# Patient Record
Sex: MALE | Race: WHITE | NOT HISPANIC OR LATINO | Employment: OTHER | URBAN - METROPOLITAN AREA
[De-identification: names, ages, dates, MRNs, and addresses within clinical notes are randomized per-mention and may not be internally consistent; named-entity substitution may affect disease eponyms.]

---

## 2022-10-06 ENCOUNTER — OFFICE VISIT (OUTPATIENT)
Dept: FAMILY MEDICINE | Facility: CLINIC | Age: 67
End: 2022-10-06
Payer: MEDICARE

## 2022-10-06 VITALS
DIASTOLIC BLOOD PRESSURE: 83 MMHG | OXYGEN SATURATION: 96 % | BODY MASS INDEX: 24.4 KG/M2 | HEIGHT: 71 IN | WEIGHT: 174.3 LBS | HEART RATE: 92 BPM | SYSTOLIC BLOOD PRESSURE: 131 MMHG | TEMPERATURE: 97.3 F

## 2022-10-06 DIAGNOSIS — Z11.59 NEED FOR HEPATITIS C SCREENING TEST: ICD-10-CM

## 2022-10-06 DIAGNOSIS — E78.2 MIXED HYPERLIPIDEMIA: ICD-10-CM

## 2022-10-06 DIAGNOSIS — R73.03 PREDIABETES: ICD-10-CM

## 2022-10-06 DIAGNOSIS — Z13.0 SCREENING, DEFICIENCY ANEMIA, IRON: ICD-10-CM

## 2022-10-06 DIAGNOSIS — B07.8 OTHER VIRAL WARTS: ICD-10-CM

## 2022-10-06 DIAGNOSIS — Z12.5 SCREENING FOR PROSTATE CANCER: ICD-10-CM

## 2022-10-06 DIAGNOSIS — N40.1 BENIGN PROSTATIC HYPERPLASIA WITH NOCTURIA: ICD-10-CM

## 2022-10-06 DIAGNOSIS — Z00.00 ENCOUNTER FOR MEDICARE ANNUAL WELLNESS EXAM: Primary | ICD-10-CM

## 2022-10-06 DIAGNOSIS — Z12.11 SCREEN FOR COLON CANCER: ICD-10-CM

## 2022-10-06 DIAGNOSIS — R35.1 BENIGN PROSTATIC HYPERPLASIA WITH NOCTURIA: ICD-10-CM

## 2022-10-06 LAB
ALBUMIN SERPL-MCNC: 3.7 G/DL (ref 3.4–5)
ALBUMIN UR-MCNC: NEGATIVE MG/DL
ALP SERPL-CCNC: 83 U/L (ref 40–150)
ALT SERPL W P-5'-P-CCNC: 45 U/L (ref 0–70)
ANION GAP SERPL CALCULATED.3IONS-SCNC: 2 MMOL/L (ref 3–14)
APPEARANCE UR: CLEAR
AST SERPL W P-5'-P-CCNC: 25 U/L (ref 0–45)
BILIRUB SERPL-MCNC: 0.7 MG/DL (ref 0.2–1.3)
BILIRUB UR QL STRIP: NEGATIVE
BUN SERPL-MCNC: 23 MG/DL (ref 7–30)
CALCIUM SERPL-MCNC: 9.2 MG/DL (ref 8.5–10.1)
CHLORIDE BLD-SCNC: 111 MMOL/L (ref 94–109)
CHOLEST SERPL-MCNC: 257 MG/DL
CO2 SERPL-SCNC: 27 MMOL/L (ref 20–32)
COLOR UR AUTO: YELLOW
CREAT SERPL-MCNC: 0.94 MG/DL (ref 0.66–1.25)
ERYTHROCYTE [DISTWIDTH] IN BLOOD BY AUTOMATED COUNT: 13.3 % (ref 10–15)
FASTING STATUS PATIENT QL REPORTED: ABNORMAL
GFR SERPL CREATININE-BSD FRML MDRD: 88 ML/MIN/1.73M2
GLUCOSE BLD-MCNC: 107 MG/DL (ref 70–99)
GLUCOSE UR STRIP-MCNC: NEGATIVE MG/DL
HBA1C MFR BLD: 6 % (ref 0–5.6)
HCT VFR BLD AUTO: 43.5 % (ref 40–53)
HCV AB SERPL QL IA: NONREACTIVE
HDLC SERPL-MCNC: 43 MG/DL
HGB BLD-MCNC: 14.9 G/DL (ref 13.3–17.7)
HGB UR QL STRIP: ABNORMAL
KETONES UR STRIP-MCNC: NEGATIVE MG/DL
LDLC SERPL CALC-MCNC: 176 MG/DL
LEUKOCYTE ESTERASE UR QL STRIP: NEGATIVE
MCH RBC QN AUTO: 30 PG (ref 26.5–33)
MCHC RBC AUTO-ENTMCNC: 34.3 G/DL (ref 31.5–36.5)
MCV RBC AUTO: 88 FL (ref 78–100)
NITRATE UR QL: NEGATIVE
NONHDLC SERPL-MCNC: 214 MG/DL
PH UR STRIP: 5.5 [PH] (ref 5–7)
PLATELET # BLD AUTO: 190 10E3/UL (ref 150–450)
POTASSIUM BLD-SCNC: 3.8 MMOL/L (ref 3.4–5.3)
PROT SERPL-MCNC: 7.3 G/DL (ref 6.8–8.8)
PSA SERPL-MCNC: 1.48 UG/L (ref 0–4)
RBC # BLD AUTO: 4.97 10E6/UL (ref 4.4–5.9)
RBC #/AREA URNS AUTO: NORMAL /HPF
SODIUM SERPL-SCNC: 140 MMOL/L (ref 133–144)
SP GR UR STRIP: >=1.03 (ref 1–1.03)
TRIGL SERPL-MCNC: 189 MG/DL
UROBILINOGEN UR STRIP-ACNC: 0.2 E.U./DL
WBC # BLD AUTO: 6.6 10E3/UL (ref 4–11)
WBC #/AREA URNS AUTO: NORMAL /HPF

## 2022-10-06 PROCEDURE — 81001 URINALYSIS AUTO W/SCOPE: CPT | Performed by: FAMILY MEDICINE

## 2022-10-06 PROCEDURE — 90662 IIV NO PRSV INCREASED AG IM: CPT | Performed by: FAMILY MEDICINE

## 2022-10-06 PROCEDURE — 99214 OFFICE O/P EST MOD 30 MIN: CPT | Mod: 25 | Performed by: FAMILY MEDICINE

## 2022-10-06 PROCEDURE — G0103 PSA SCREENING: HCPCS | Performed by: FAMILY MEDICINE

## 2022-10-06 PROCEDURE — G0438 PPPS, INITIAL VISIT: HCPCS | Performed by: FAMILY MEDICINE

## 2022-10-06 PROCEDURE — 80053 COMPREHEN METABOLIC PANEL: CPT | Performed by: FAMILY MEDICINE

## 2022-10-06 PROCEDURE — 85027 COMPLETE CBC AUTOMATED: CPT | Performed by: FAMILY MEDICINE

## 2022-10-06 PROCEDURE — 80061 LIPID PANEL: CPT | Performed by: FAMILY MEDICINE

## 2022-10-06 PROCEDURE — 17110 DESTRUCTION B9 LES UP TO 14: CPT | Performed by: FAMILY MEDICINE

## 2022-10-06 PROCEDURE — 36415 COLL VENOUS BLD VENIPUNCTURE: CPT | Performed by: FAMILY MEDICINE

## 2022-10-06 PROCEDURE — 86803 HEPATITIS C AB TEST: CPT | Performed by: FAMILY MEDICINE

## 2022-10-06 PROCEDURE — 83036 HEMOGLOBIN GLYCOSYLATED A1C: CPT | Performed by: FAMILY MEDICINE

## 2022-10-06 PROCEDURE — G0008 ADMIN INFLUENZA VIRUS VAC: HCPCS | Performed by: FAMILY MEDICINE

## 2022-10-06 RX ORDER — TAMSULOSIN HYDROCHLORIDE 0.4 MG/1
0.4 CAPSULE ORAL DAILY
Qty: 90 CAPSULE | Refills: 3 | Status: SHIPPED | OUTPATIENT
Start: 2022-10-06 | End: 2023-07-31

## 2022-10-06 ASSESSMENT — ENCOUNTER SYMPTOMS
FREQUENCY: 1
NERVOUS/ANXIOUS: 0
COUGH: 0
DIARRHEA: 0
PARESTHESIAS: 0
JOINT SWELLING: 1
SORE THROAT: 0
ABDOMINAL PAIN: 0
DIZZINESS: 0
DYSURIA: 0
HEARTBURN: 0
WEAKNESS: 1
CONSTIPATION: 0
SHORTNESS OF BREATH: 0
HEADACHES: 0
PALPITATIONS: 0
FEVER: 0
HEMATURIA: 0
CHILLS: 0
HEMATOCHEZIA: 0
MYALGIAS: 0
NAUSEA: 0
EYE PAIN: 0
ARTHRALGIAS: 0

## 2022-10-06 ASSESSMENT — ACTIVITIES OF DAILY LIVING (ADL): CURRENT_FUNCTION: NO ASSISTANCE NEEDED

## 2022-10-06 NOTE — PATIENT INSTRUCTIONS
Patient Education   Personalized Prevention Plan  You are due for the preventive services outlined below.  Your care team is available to assist you in scheduling these services.  If you have already completed any of these items, please share that information with your care team to update in your medical record.  Health Maintenance Due   Topic Date Due     ANNUAL REVIEW OF HM ORDERS  Never done     Colorectal Cancer Screening  Never done     Hepatitis C Screening  Never done     Cholesterol Lab  Never done     LUNG CANCER SCREENING  Never done     AORTIC ANEURYSM SCREENING (SYSTEM ASSIGNED)  Never done     Pneumococcal Vaccine (2 - PCV) 07/02/2020     COVID-19 Vaccine (5 - Booster for Pfizer series) 07/30/2022     Flu Vaccine (1) 09/01/2022

## 2022-10-06 NOTE — PROGRESS NOTES
"SUBJECTIVE:   Tiago is a 67 year old who presents for Preventive Visit.    Patient has been advised of split billing requirements and indicates understanding: Yes  Are you in the first 12 months of your Medicare coverage?  No    Healthy Habits:     In general, how would you rate your overall health?  Good    Frequency of exercise:  4-5 days/week    Duration of exercise:  30-45 minutes    Do you usually eat at least 4 servings of fruit and vegetables a day, include whole grains    & fiber and avoid regularly eating high fat or \"junk\" foods?  Yes    Taking medications regularly:  Not Applicable    Medication side effects:  Not applicable and None    Ability to successfully perform activities of daily living:  No assistance needed    Home Safety:  Throw rugs in the hallway    Hearing Impairment:  No hearing concerns    In the past 6 months, have you been bothered by leaking of urine?  No    In general, how would you rate your overall mental or emotional health?  Good      PHQ-2 Total Score: 0    Additional concerns today:  No    He has a medical history significant for hyperlipidemia and prediabetes.  On 10/30/2019 he had a hemoglobin A1c of 6.1%, , triglycerides 192.  In 2019 he also had a negative Cologuard test.  He is concerned today about waking up to urinate at night approximately 2-4 times.  He is not having dysuria or hematuria symptoms.  He describes having a weak urinary stream and he usually feels like he is able to empty his bladder after urinating.  He denies having significant urinary frequency symptoms during the day.  He has tried limiting fluids which has not helped much.  He also has some firm bumps in the left index finger in the area of the DIP joint.    Social history: .  He is originally from [place].    Do you feel safe in your environment? Yes    Have you ever done Advance Care Planning? (For example, a Health Directive, POLST, or a discussion with a medical provider or your loved " ones about your wishes): No, advance care planning information given to patient to review.  Patient declined advance care planning discussion at this time.       Fall risk  Fallen 2 or more times in the past year?: No  Any fall with injury in the past year?: No  Cognitive Screening   1) Repeat 3 items (Leader, Season, Table)    2) Clock draw: ABNORMAL put the pick hand on 10   3) 3 item recall: Recalls 2 objects   Results: ABNORMAL clock he mixed up the large and small hands of the clock, 2 items recalled     Mini-CogTM Copyright MERLYN Chiu. Licensed by the author for use in [facility]; reprinted with permission ([email or web address]). All rights reserved.      Do you have sleep apnea, excessive snoring or daytime drowsiness?: He has noticed some daytime fatigue    Reviewed and updated as needed this visit by clinical staff    Allergies  Meds                Reviewed and updated as needed this visit by Provider     Meds               Social History     Tobacco Use     Smoking status: Former Smoker     Smokeless tobacco: Never Used   Substance Use Topics     Alcohol use: Not Currently     If you drink alcohol do you typically have >3 drinks per day or >7 drinks per week? No    Alcohol Use 10/6/2022   Prescreen: >3 drinks/day or >7 drinks/week? No   Prescreen: >3 drinks/day or >7 drinks/week? -   No flowsheet data found.          Current providers sharing in care for this patient include:   Patient Care Team:  Jean Pierre Barlow DO as PCP - General (Family Medicine)    The following health maintenance items are reviewed in Epic and correct as of today:  Health Maintenance   Topic Date Due     ANNUAL REVIEW OF HM ORDERS  Never done     ADVANCE CARE PLANNING  Never done     COLORECTAL CANCER SCREENING  Never done     HEPATITIS C SCREENING  Never done     LIPID  Never done     LUNG CANCER SCREENING  Never done     AORTIC ANEURYSM SCREENING (SYSTEM ASSIGNED)  Never done     Pneumococcal Vaccine: 65+ Years (2 -  "PCV) 07/02/2020     COVID-19 Vaccine (5 - Booster for Pfizer series) 07/30/2022     INFLUENZA VACCINE (1) 09/01/2022     MEDICARE ANNUAL WELLNESS VISIT  10/06/2023     FALL RISK ASSESSMENT  10/06/2023     DTAP/TDAP/TD IMMUNIZATION (2 - Td or Tdap) 06/21/2026     PHQ-2 (once per calendar year)  Completed     ZOSTER IMMUNIZATION  Completed     IPV IMMUNIZATION  Aged Out     MENINGITIS IMMUNIZATION  Aged Out     HEPATITIS B IMMUNIZATION  Aged Out     Lab work is in process  Labs reviewed in EPIC      Review of Systems   Constitutional: Negative for chills and fever.   HENT: Negative for congestion, ear pain, hearing loss and sore throat.    Eyes: Negative for pain and visual disturbance.   Respiratory: Negative for cough and shortness of breath.    Cardiovascular: Negative for chest pain, palpitations and peripheral edema.   Gastrointestinal: Negative for abdominal pain, constipation, diarrhea, heartburn, hematochezia and nausea.   Genitourinary: Positive for frequency. Negative for dysuria, genital sores, hematuria, impotence, penile discharge and urgency.   Musculoskeletal: Positive for joint swelling. Negative for arthralgias and myalgias.   Skin: Negative for rash.   Neurological: Positive for weakness. Negative for dizziness, headaches and paresthesias.   Psychiatric/Behavioral: Negative for mood changes. The patient is not nervous/anxious.          OBJECTIVE:   /83   Pulse 92   Temp 97.3  F (36.3  C) (Temporal)   Ht 1.803 m (5' 11\")   Wt 79.1 kg (174 lb 4.8 oz)   SpO2 96%   BMI 24.31 kg/m   Estimated body mass index is 24.31 kg/m  as calculated from the following:    Height as of this encounter: 1.803 m (5' 11\").    Weight as of this encounter: 79.1 kg (174 lb 4.8 oz).  Physical Exam  GENERAL: healthy, alert and no distress  EYES: Eyes grossly normal to inspection, PERRL and conjunctivae and sclerae normal  HENT: ear canals and TM's normal, nose and mouth without ulcers or lesions  NECK: no " adenopathy, no asymmetry, masses, or scars and thyroid normal to palpation  RESP: lungs clear to auscultation - no rales, rhonchi or wheezes  CV: regular rate and rhythm, normal S1 S2, no S3 or S4, no murmur, click or rub, no peripheral edema and peripheral pulses strong  ABDOMEN: soft, nontender, no hepatosplenomegaly, no masses and bowel sounds normal  MS: no gross musculoskeletal defects noted, no edema.  There are a couple of firm nodules at the DIP joint of the left index finger.  He is able to flex and extend the finger, but this range of motion is slightly diminished.  SKIN: There is a circular, hyperkeratotic skin lesion on the left index finger consistent with a viral wart.  NEURO: Normal strength and tone, mentation intact and speech normal  PSYCH: mentation appears normal, affect normal/bright  Rectal: Normal sphincter tone.  The prostate is smooth, nontender and moderately enlarged    Diagnostic Test Results:  Labs reviewed in Epic    ASSESSMENT / PLAN:   1. Encounter for Medicare annual wellness exam  Encouraged him to keep getting regular exercise and eat a healthy diet.  We are going to check nonfasting labs today.    2. Mixed hyperlipidemia  He will continue to work on lifestyle modifications and we are going to be checking labs as listed below.  - Lipid panel reflex to direct LDL Non-fasting; Future  - Comprehensive metabolic panel (BMP + Alb, Alk Phos, ALT, AST, Total. Bili, TP); Future    3. Prediabetes  I recommended including a hemoglobin A1c as part of the lab work-up today.  - Hemoglobin A1c; Future    4. Benign prostatic hyperplasia with nocturia  He has symptoms and exam findings consistent with BPH.  We discussed treatment options and decided to start tamsulosin.  Potential side effects were discussed.  We will also be checking a urinalysis today.  If symptoms do not improve he will let me know and we will consider referring to urology.  - UA with Microscopic reflex to Culture - lab  "collect; Future  - tamsulosin (FLOMAX) 0.4 MG capsule; Take 1 capsule (0.4 mg) by mouth daily  Dispense: 90 capsule; Refill: 3    5. Other viral warts  We discussed treatment options and decided to proceed with cryotherapy. I applied 3 freeze thaw cycles of liquid nitrogen which she tolerated well. Wound care instructions given.  - DESTRUCT BENIGN LESION, UP TO 14    6. Screen for colon cancer  - COLOGUARD(EXACT SCIENCES)    7. Need for hepatitis C screening test  - Hepatitis C Screen Reflex to HCV RNA Quant and Genotype; Future    8. Screening, deficiency anemia, iron  - CBC with platelets; Future    9. Screening for prostate cancer  - PSA, screen; Future        Patient has been advised of split billing requirements and indicates understanding: Yes    COUNSELING:  Reviewed preventive health counseling, as reflected in patient instructions       Regular exercise       Healthy diet/nutrition       Colon cancer screening       Prostate cancer screening    Estimated body mass index is 24.31 kg/m  as calculated from the following:    Height as of this encounter: 1.803 m (5' 11\").    Weight as of this encounter: 79.1 kg (174 lb 4.8 oz).        He reports that he has quit smoking. He has never used smokeless tobacco.      Appropriate preventive services were discussed with this patient, including applicable screening as appropriate for cardiovascular disease, diabetes, osteopenia/osteoporosis, and glaucoma.  As appropriate for age/gender, discussed screening for colorectal cancer, prostate cancer, breast cancer, and cervical cancer. Checklist reviewing preventive services available has been given to the patient.    Reviewed patients plan of care and provided an AVS. The Basic Care Plan (routine screening as documented in Health Maintenance) for Tiago meets the Care Plan requirement. This Care Plan has been established and reviewed with the Patient.    Counseling Resources:  ATP IV Guidelines  Pooled Cohorts Equation " Calculator  Breast Cancer Risk Calculator  Breast Cancer: Medication to Reduce Risk  FRAX Risk Assessment  ICSI Preventive Guidelines  Dietary Guidelines for Americans, 2010  USDA's MyPlate  ASA Prophylaxis  Lung CA Screening    Jean Pierre Payne DO  [facility]    Identified Health Risks:

## 2022-10-10 DIAGNOSIS — E78.2 MIXED HYPERLIPIDEMIA: Primary | ICD-10-CM

## 2022-10-10 RX ORDER — ATORVASTATIN CALCIUM 20 MG/1
20 TABLET, FILM COATED ORAL DAILY
Qty: 90 TABLET | Refills: 3 | Status: SHIPPED | OUTPATIENT
Start: 2022-10-10 | End: 2024-08-13

## 2022-11-18 LAB — NONINV COLON CA DNA+OCC BLD SCRN STL QL: NEGATIVE

## 2023-07-31 ENCOUNTER — OFFICE VISIT (OUTPATIENT)
Dept: FAMILY MEDICINE | Facility: CLINIC | Age: 68
End: 2023-07-31
Payer: COMMERCIAL

## 2023-07-31 VITALS
SYSTOLIC BLOOD PRESSURE: 120 MMHG | BODY MASS INDEX: 23.94 KG/M2 | OXYGEN SATURATION: 98 % | RESPIRATION RATE: 16 BRPM | WEIGHT: 171 LBS | TEMPERATURE: 97.5 F | HEIGHT: 71 IN | DIASTOLIC BLOOD PRESSURE: 74 MMHG | HEART RATE: 64 BPM

## 2023-07-31 DIAGNOSIS — Z12.5 ENCOUNTER FOR SCREENING FOR MALIGNANT NEOPLASM OF PROSTATE: ICD-10-CM

## 2023-07-31 DIAGNOSIS — E78.2 MIXED HYPERLIPIDEMIA: ICD-10-CM

## 2023-07-31 DIAGNOSIS — Z13.6 SCREENING FOR AAA (ABDOMINAL AORTIC ANEURYSM): ICD-10-CM

## 2023-07-31 DIAGNOSIS — R35.1 BENIGN PROSTATIC HYPERPLASIA WITH NOCTURIA: ICD-10-CM

## 2023-07-31 DIAGNOSIS — E78.5 HYPERLIPIDEMIA LDL GOAL <100: ICD-10-CM

## 2023-07-31 DIAGNOSIS — N40.1 BENIGN PROSTATIC HYPERPLASIA WITH NOCTURIA: ICD-10-CM

## 2023-07-31 DIAGNOSIS — I71.43 INFRARENAL ABDOMINAL AORTIC ANEURYSM (AAA) WITHOUT RUPTURE (H): ICD-10-CM

## 2023-07-31 DIAGNOSIS — Z00.00 ENCOUNTER FOR MEDICARE ANNUAL WELLNESS EXAM: Primary | ICD-10-CM

## 2023-07-31 PROBLEM — R73.03 PREDIABETES: Status: ACTIVE | Noted: 2019-11-05

## 2023-07-31 PROBLEM — E78.00 PURE HYPERCHOLESTEROLEMIA: Status: RESOLVED | Noted: 2019-11-05 | Resolved: 2023-07-31

## 2023-07-31 PROCEDURE — 90677 PCV20 VACCINE IM: CPT | Performed by: FAMILY MEDICINE

## 2023-07-31 PROCEDURE — G0009 ADMIN PNEUMOCOCCAL VACCINE: HCPCS | Performed by: FAMILY MEDICINE

## 2023-07-31 PROCEDURE — G0439 PPPS, SUBSEQ VISIT: HCPCS | Performed by: FAMILY MEDICINE

## 2023-07-31 RX ORDER — TAMSULOSIN HYDROCHLORIDE 0.4 MG/1
0.4 CAPSULE ORAL DAILY
Qty: 90 CAPSULE | Refills: 3 | Status: SHIPPED | OUTPATIENT
Start: 2023-07-31 | End: 2024-07-10

## 2023-07-31 ASSESSMENT — ENCOUNTER SYMPTOMS
SHORTNESS OF BREATH: 0
DIZZINESS: 0
HEMATURIA: 0
DIARRHEA: 0
HEADACHES: 0
COUGH: 0
CONSTIPATION: 0
SORE THROAT: 0
HEMATOCHEZIA: 0
FREQUENCY: 1
CHILLS: 0
NERVOUS/ANXIOUS: 0
WEAKNESS: 0
FEVER: 0
MYALGIAS: 0
HEARTBURN: 0
PARESTHESIAS: 0
EYE PAIN: 0
PALPITATIONS: 0
NAUSEA: 0
ABDOMINAL PAIN: 0
JOINT SWELLING: 0
DYSURIA: 0
ARTHRALGIAS: 0

## 2023-07-31 ASSESSMENT — PAIN SCALES - GENERAL: PAINLEVEL: NO PAIN (0)

## 2023-07-31 ASSESSMENT — ACTIVITIES OF DAILY LIVING (ADL): CURRENT_FUNCTION: NO ASSISTANCE NEEDED

## 2023-07-31 NOTE — PROGRESS NOTES
"SUBJECTIVE:   Tiago is a 67 year old who presents for Preventive Visit.      7/31/2023     2:22 PM   Additional Questions   Roomed by Rosario CORRIGAN       Are you in the first 12 months of your Medicare coverage?  No    Healthy Habits:     In general, how would you rate your overall health?  Good    Frequency of exercise:  2-3 days/week    Duration of exercise:  15-30 minutes    Do you usually eat at least 4 servings of fruit and vegetables a day, include whole grains    & fiber and avoid regularly eating high fat or \"junk\" foods?  Yes    Taking medications regularly:  Yes    Ability to successfully perform activities of daily living:  No assistance needed    Home Safety:  No safety concerns identified    Hearing Impairment:  No hearing concerns    In the past 6 months, have you been bothered by leaking of urine?  No    In general, how would you rate your overall mental or emotional health?  Good    Additional concerns today:  No    Does not desire to take a statin medication.     Have you ever done Advance Care Planning? (For example, a Health Directive, POLST, or a discussion with a medical provider or your loved ones about your wishes): Yes, patient states has an Advance Care Planning document and will bring a copy to the clinic.    Fall risk  Fallen 2 or more times in the past year?: No  Any fall with injury in the past year?: No    Cognitive Screening   1) Repeat 3 items (Leader, Season, Table)    2) Clock draw: Normal  3) 3 item recall: Recalls 2 objects   Results: normal. Able to recall 1-2 items.     Mini-CogTM Copyright MERLYN Chiu. Licensed by the author for use in [facility]; reprinted with permission ([email or web address]). All rights reserved.      Patient lives with his wife. They do their own ADLS. Denies any recent falls.     Do you have sleep apnea, excessive snoring or daytime drowsiness? : yes    Reviewed and updated as needed this visit by clinical staff                  Reviewed and updated as needed " this visit by Provider                 Social History     Tobacco Use    Smoking status: Former    Smokeless tobacco: Never   Substance Use Topics    Alcohol use: Not Currently         7/31/2023     2:06 PM   Alcohol Use   Prescreen: >3 drinks/day or >7 drinks/week? No       Do you have a current opioid prescription? No  Do you use any other controlled substances or medications that are not prescribed by a provider? None    Medication Followup of tamsulosin 0.4 mg  Taking Medication as prescribed: yes  Side Effects:  None  Medication Helping Symptoms:  yes    Current providers sharing in care for this patient include:   Patient Care Team:  Jean Pierre Barlow DO as PCP - General (Family Medicine)  Jean Pierre Barlow DO as Assigned PCP    The following health maintenance items are reviewed in Epic and correct as of today:  Health Maintenance   Topic Date Due    LUNG CANCER SCREENING  Never done    AORTIC ANEURYSM SCREENING (SYSTEM ASSIGNED)  Never done    Pneumococcal Vaccine: 65+ Years (2 - PCV) 07/02/2020    COVID-19 Vaccine (6 - Pfizer series) 04/11/2023    INFLUENZA VACCINE (1) 09/01/2023    MEDICARE ANNUAL WELLNESS VISIT  10/06/2023    ANNUAL REVIEW OF HM ORDERS  10/06/2023    FALL RISK ASSESSMENT  07/31/2024    COLORECTAL CANCER SCREENING  11/14/2025    DTAP/TDAP/TD IMMUNIZATION (3 - Td or Tdap) 06/21/2026    LIPID  10/06/2027    ADVANCE CARE PLANNING  10/06/2027    HEPATITIS C SCREENING  Completed    PHQ-2 (once per calendar year)  Completed    ZOSTER IMMUNIZATION  Completed    IPV IMMUNIZATION  Aged Out    MENINGITIS IMMUNIZATION  Aged Out     Labs reviewed in EPIC  Review of Systems   Constitutional:  Negative for chills and fever.   HENT:  Negative for congestion, ear pain, hearing loss and sore throat.    Eyes:  Negative for pain and visual disturbance.   Respiratory:  Negative for cough and shortness of breath.    Cardiovascular:  Negative for chest pain, palpitations and peripheral edema.  "  Gastrointestinal:  Negative for abdominal pain, constipation, diarrhea, heartburn, hematochezia and nausea.   Genitourinary:  Positive for frequency. Negative for dysuria, genital sores, hematuria and urgency.   Musculoskeletal:  Negative for arthralgias, joint swelling and myalgias.   Skin:  Negative for rash.   Neurological:  Negative for dizziness, weakness, headaches and paresthesias.   Psychiatric/Behavioral:  Negative for mood changes. The patient is not nervous/anxious.      OBJECTIVE:   There were no vitals taken for this visit. Estimated body mass index is 24.31 kg/m  as calculated from the following:    Height as of 10/6/22: 1.803 m (5' 11\").    Weight as of 10/6/22: 79.1 kg (174 lb 4.8 oz).  Physical Exam  GENERAL: healthy, alert and no distress  EYES: Eyes grossly normal to inspection, PERRL and conjunctivae and sclerae normal  HENT: ear canals and TM's normal, nose and mouth without ulcers or lesions  NECK: no adenopathy, no asymmetry, masses, or scars and thyroid normal to palpation  RESP: lungs clear to auscultation - no rales, rhonchi or wheezes  CV: regular rate and rhythm, normal S1 S2, no S3 or S4, no murmur, click or rub, no peripheral edema and peripheral pulses strong  ABDOMEN: soft, nontender, no hepatosplenomegaly, no masses and bowel sounds normal  MS: no gross musculoskeletal defects noted, no edema  SKIN: no suspicious lesions or rashes  NEURO: Normal strength and tone, mentation intact and speech normal  PSYCH: mentation appears normal, affect normal/bright    Diagnostic Test Results:  Labs reviewed in Epic    ASSESSMENT / PLAN:       ICD-10-CM    1. Encounter for Medicare annual wellness exam  Z00.00 Lipid panel reflex to direct LDL Fasting     CBC with platelets     Comprehensive metabolic panel (BMP + Alb, Alk Phos, ALT, AST, Total. Bili, TP)     Hemoglobin A1c      2. Benign prostatic hyperplasia with nocturia  N40.1 tamsulosin (FLOMAX) 0.4 MG capsule    R35.1 PSA, screen      3. " Mixed hyperlipidemia  E78.2 CT Coronary Calcium Scan      4. Encounter for screening for malignant neoplasm of prostate  Z12.5 PSA, screen      5. Screening for AAA (abdominal aortic aneurysm)  Z13.6 US Aorta Medicare AAA Screening          Patient has been advised of split billing requirements and indicates understanding: Yes      COUNSELING:  Reviewed preventive health counseling, as reflected in patient instructions       Regular exercise       Healthy diet/nutrition    He reports that he has quit smoking. He has never used smokeless tobacco.      Appropriate preventive services were discussed with this patient, including applicable screening as appropriate for cardiovascular disease, diabetes, osteopenia/osteoporosis, and glaucoma.  As appropriate for age/gender, discussed screening for colorectal cancer, prostate cancer, breast cancer, and cervical cancer. Checklist reviewing preventive services available has been given to the patient.    Reviewed patients plan of care and provided an AVS. The Basic Care Plan (routine screening as documented in Health Maintenance) for Tiago meets the Care Plan requirement. This Care Plan has been established and reviewed with the Patient.          Kyleigh Morales MD  [facility]

## 2023-07-31 NOTE — PATIENT INSTRUCTIONS
Patient Education   Personalized Prevention Plan  You are due for the preventive services outlined below.  Your care team is available to assist you in scheduling these services.  If you have already completed any of these items, please share that information with your care team to update in your medical record.  Health Maintenance Due   Topic Date Due     LUNG CANCER SCREENING  Never done     AORTIC ANEURYSM SCREENING (SYSTEM ASSIGNED)  Never done     Pneumococcal Vaccine (2 - PCV) 07/02/2020     COVID-19 Vaccine (6 - Pfizer series) 04/11/2023

## 2023-08-07 ENCOUNTER — LAB (OUTPATIENT)
Dept: LAB | Facility: CLINIC | Age: 68
End: 2023-08-07
Payer: COMMERCIAL

## 2023-08-07 DIAGNOSIS — R35.1 BENIGN PROSTATIC HYPERPLASIA WITH NOCTURIA: ICD-10-CM

## 2023-08-07 DIAGNOSIS — Z12.5 ENCOUNTER FOR SCREENING FOR MALIGNANT NEOPLASM OF PROSTATE: ICD-10-CM

## 2023-08-07 DIAGNOSIS — N40.1 BENIGN PROSTATIC HYPERPLASIA WITH NOCTURIA: ICD-10-CM

## 2023-08-07 DIAGNOSIS — Z00.00 ENCOUNTER FOR MEDICARE ANNUAL WELLNESS EXAM: ICD-10-CM

## 2023-08-07 LAB
ALBUMIN SERPL BCG-MCNC: 4.6 G/DL (ref 3.5–5.2)
ALP SERPL-CCNC: 92 U/L (ref 40–129)
ALT SERPL W P-5'-P-CCNC: 24 U/L (ref 0–70)
ANION GAP SERPL CALCULATED.3IONS-SCNC: 11 MMOL/L (ref 7–15)
AST SERPL W P-5'-P-CCNC: 28 U/L (ref 0–45)
BILIRUB SERPL-MCNC: 0.6 MG/DL
BUN SERPL-MCNC: 14.9 MG/DL (ref 8–23)
CALCIUM SERPL-MCNC: 9.4 MG/DL (ref 8.8–10.2)
CHLORIDE SERPL-SCNC: 108 MMOL/L (ref 98–107)
CHOLEST SERPL-MCNC: 229 MG/DL
CREAT SERPL-MCNC: 1.16 MG/DL (ref 0.67–1.17)
DEPRECATED HCO3 PLAS-SCNC: 22 MMOL/L (ref 22–29)
ERYTHROCYTE [DISTWIDTH] IN BLOOD BY AUTOMATED COUNT: 12.4 % (ref 10–15)
GFR SERPL CREATININE-BSD FRML MDRD: 68 ML/MIN/1.73M2
GLUCOSE SERPL-MCNC: 107 MG/DL (ref 70–99)
HBA1C MFR BLD: 6.1 % (ref 0–5.6)
HCT VFR BLD AUTO: 46.4 % (ref 40–53)
HDLC SERPL-MCNC: 43 MG/DL
HGB BLD-MCNC: 15.2 G/DL (ref 13.3–17.7)
LDLC SERPL CALC-MCNC: 145 MG/DL
MCH RBC QN AUTO: 29 PG (ref 26.5–33)
MCHC RBC AUTO-ENTMCNC: 32.8 G/DL (ref 31.5–36.5)
MCV RBC AUTO: 89 FL (ref 78–100)
NONHDLC SERPL-MCNC: 186 MG/DL
PLATELET # BLD AUTO: 195 10E3/UL (ref 150–450)
POTASSIUM SERPL-SCNC: 4 MMOL/L (ref 3.4–5.3)
PROT SERPL-MCNC: 7.7 G/DL (ref 6.4–8.3)
PSA SERPL DL<=0.01 NG/ML-MCNC: 1.43 NG/ML (ref 0–4.5)
RBC # BLD AUTO: 5.24 10E6/UL (ref 4.4–5.9)
SODIUM SERPL-SCNC: 141 MMOL/L (ref 136–145)
TRIGL SERPL-MCNC: 207 MG/DL
WBC # BLD AUTO: 6.4 10E3/UL (ref 4–11)

## 2023-08-07 PROCEDURE — G0103 PSA SCREENING: HCPCS

## 2023-08-07 PROCEDURE — 36415 COLL VENOUS BLD VENIPUNCTURE: CPT

## 2023-08-07 PROCEDURE — 80053 COMPREHEN METABOLIC PANEL: CPT

## 2023-08-07 PROCEDURE — 83036 HEMOGLOBIN GLYCOSYLATED A1C: CPT

## 2023-08-07 PROCEDURE — 80061 LIPID PANEL: CPT

## 2023-08-07 PROCEDURE — 85027 COMPLETE CBC AUTOMATED: CPT

## 2023-08-07 NOTE — LETTER
"August 11, 2023      Tiago Torres  [address]   [facility] [address]        Dear ,    We are writing to inform you of your test results.  1- Your results revealed a mild elevation in your A1C. A1C is a measurement of your average blood glucose over the past 3 months. Your results are consistent with pre-diabetes. I would recommend dietary restriction of high calorie meals and avoiding simple carbohydrates to prevent progression into diabetes.  We will re-check your A1C in 3-6 months.  2-  Your cholesterol panel is abnormal. Your total cholesterol, triglycerides and LDL\"bad cholesterol\" levels are elevated. This can increase your risk of cardiovascular disease in the future.  To determine your risk of heart attack or stroke within the next 10 years, we used the calculation developed by the American Heart Association and the American College of Cardiology.   We normally start medication when the risk is more than 7.5%. Your current calculated risk is at 17.5%.     I would recommend starting a low dose cholesterol lowering medication such as Crestor 10mg once daily. A prescription was sent to your pharmacy.     In 3 months, you should recheck your fasting cholesterol panel by scheduling a lab-only appointment.     Let me know if you have any questions.      Resulted Orders   Lipid panel reflex to direct LDL Fasting   Result Value Ref Range    Cholesterol 229 (H) <200 mg/dL    Triglycerides 207 (H) <150 mg/dL    Direct Measure HDL 43 >=40 mg/dL    LDL Cholesterol Calculated 145 (H) <=100 mg/dL    Non HDL Cholesterol 186 (H) <130 mg/dL    Narrative    Cholesterol  Desirable:  <200 mg/dL    Triglycerides  Normal:  Less than 150 mg/dL  Borderline High:  150-199 mg/dL  High:  200-499 mg/dL  Very High:  Greater than or equal to 500 mg/dL    Direct Measure HDL  Female:  Greater than or equal to 50 mg/dL   Male:  Greater than or equal to 40 mg/dL    LDL Cholesterol  Desirable:  <100mg/dL  Above " Desirable:  100-129 mg/dL   Borderline High:  130-159 mg/dL   High:  160-189 mg/dL   Very High:  >= 190 mg/dL    Non HDL Cholesterol  Desirable:  130 mg/dL  Above Desirable:  130-159 mg/dL  Borderline High:  160-189 mg/dL  High:  190-219 mg/dL  Very High:  Greater than or equal to 220 mg/dL   CBC with platelets   Result Value Ref Range    WBC Count 6.4 4.0 - 11.0 10e3/uL    RBC Count 5.24 4.40 - 5.90 10e6/uL    Hemoglobin 15.2 13.3 - 17.7 g/dL    Hematocrit 46.4 40.0 - 53.0 %    MCV 89 78 - 100 fL    MCH 29.0 26.5 - 33.0 pg    MCHC 32.8 31.5 - 36.5 g/dL    RDW 12.4 10.0 - 15.0 %    Platelet Count 195 150 - 450 10e3/uL   Comprehensive metabolic panel (BMP + Alb, Alk Phos, ALT, AST, Total. Bili, TP)   Result Value Ref Range    Sodium 141 136 - 145 mmol/L    Potassium 4.0 3.4 - 5.3 mmol/L    Chloride 108 (H) 98 - 107 mmol/L    Carbon Dioxide (CO2) 22 22 - 29 mmol/L    Anion Gap 11 7 - 15 mmol/L    Urea Nitrogen 14.9 8.0 - 23.0 mg/dL    Creatinine 1.16 0.67 - 1.17 mg/dL    Calcium 9.4 8.8 - 10.2 mg/dL    Glucose 107 (H) 70 - 99 mg/dL    Alkaline Phosphatase 92 40 - 129 U/L    AST 28 0 - 45 U/L      Comment:      Reference intervals for this test were updated on 6/12/2023 to more accurately reflect our healthy population. There may be differences in the flagging of prior results with similar values performed with this method. Interpretation of those prior results can be made in the context of the updated reference intervals.    ALT 24 0 - 70 U/L      Comment:      Reference intervals for this test were updated on 6/12/2023 to more accurately reflect our healthy population. There may be differences in the flagging of prior results with similar values performed with this method. Interpretation of those prior results can be made in the context of the updated reference intervals.      Protein Total 7.7 6.4 - 8.3 g/dL    Albumin 4.6 3.5 - 5.2 g/dL    Bilirubin Total 0.6 <=1.2 mg/dL    GFR Estimate 68 >60 mL/min/1.73m2   PSA,  screen   Result Value Ref Range    Prostate Specific Antigen Screen 1.43 0.00 - 4.50 ng/mL    Narrative    This result is obtained using the Roche Elecsys total PSA method on the abraham [id number] immunoassay analyzer. Results obtained with different assay methods or kits cannot be used interchangeably.   Hemoglobin A1c   Result Value Ref Range    Hemoglobin A1C 6.1 (H) 0.0 - 5.6 %      Comment:      Normal <5.7%   Prediabetes 5.7-6.4%    Diabetes 6.5% or higher     Note: Adopted from ADA consensus guidelines.       If you have any questions or concerns, please call the clinic at the number listed above.       Sincerely,      Kyleigh Morales MD

## 2023-08-08 ENCOUNTER — ANCILLARY PROCEDURE (OUTPATIENT)
Dept: ULTRASOUND IMAGING | Facility: CLINIC | Age: 68
End: 2023-08-08
Attending: FAMILY MEDICINE
Payer: COMMERCIAL

## 2023-08-08 DIAGNOSIS — Z13.6 SCREENING FOR AAA (ABDOMINAL AORTIC ANEURYSM): ICD-10-CM

## 2023-08-08 PROCEDURE — 76706 US ABDL AORTA SCREEN AAA: CPT | Performed by: RADIOLOGY

## 2023-08-09 PROBLEM — I71.43 INFRARENAL ABDOMINAL AORTIC ANEURYSM (AAA) WITHOUT RUPTURE (H): Status: ACTIVE | Noted: 2023-08-09

## 2023-08-09 NOTE — RESULT ENCOUNTER NOTE
Dear Tiago,   Your results revealed Infrarenal abdominal aortic aneurysm measures up to 3.1 cm in diameter.  Measurement increased in size since November 2019.  I would recommend repeating your ultrasound in 1 year.      Best Regards  Kyleigh Morales MD

## 2023-08-10 RX ORDER — ROSUVASTATIN CALCIUM 10 MG/1
10 TABLET, COATED ORAL DAILY
Qty: 90 TABLET | Refills: 1 | Status: SHIPPED | OUTPATIENT
Start: 2023-08-10 | End: 2024-08-13

## 2023-08-10 NOTE — RESULT ENCOUNTER NOTE
"The 10-year ASCVD risk score (Simran LOONEY, et al., 2019) is: 17.5%    Dear Tiago,   1- Your results revealed a mild elevation in your A1C. A1C is a measurement of your average blood glucose over the past 3 months. Your results are consistent with pre-diabetes. I would recommend dietary restriction of high calorie meals and avoiding simple carbohydrates to prevent progression into diabetes.   We will re-check your A1C in 3-6 months.   2-  Your cholesterol panel is abnormal. Your total cholesterol, triglycerides and LDL\"bad cholesterol\" levels are elevated. This can increase your risk of cardiovascular disease in the future.  To determine your risk of heart attack or stroke within the next 10 years, we used the calculation developed by the American Heart Association and the American College of Cardiology.   We normally start medication when the risk is more than 7.5%. Your current calculated risk is at 17.5%.    I would recommend starting a low dose cholesterol lowering medication such as Crestor 10mg once daily. A prescription was sent to your pharmacy.     In 3 months, you should recheck your fasting cholesterol panel by scheduling a lab-only appointment.    Let me know if you have any questions.      Best Regards   Kyleigh Morales MD"

## 2024-07-01 ENCOUNTER — PATIENT OUTREACH (OUTPATIENT)
Dept: CARE COORDINATION | Facility: CLINIC | Age: 69
End: 2024-07-01
Payer: COMMERCIAL

## 2024-07-10 DIAGNOSIS — N40.1 BENIGN PROSTATIC HYPERPLASIA WITH NOCTURIA: ICD-10-CM

## 2024-07-10 DIAGNOSIS — R35.1 BENIGN PROSTATIC HYPERPLASIA WITH NOCTURIA: ICD-10-CM

## 2024-07-11 RX ORDER — TAMSULOSIN HYDROCHLORIDE 0.4 MG/1
0.4 CAPSULE ORAL DAILY
Qty: 90 CAPSULE | Refills: 0 | Status: SHIPPED | OUTPATIENT
Start: 2024-07-11 | End: 2024-08-13

## 2024-08-13 ENCOUNTER — OFFICE VISIT (OUTPATIENT)
Dept: FAMILY MEDICINE | Facility: CLINIC | Age: 69
End: 2024-08-13
Attending: FAMILY MEDICINE
Payer: COMMERCIAL

## 2024-08-13 VITALS
HEART RATE: 72 BPM | RESPIRATION RATE: 16 BRPM | TEMPERATURE: 97.7 F | SYSTOLIC BLOOD PRESSURE: 136 MMHG | OXYGEN SATURATION: 96 % | DIASTOLIC BLOOD PRESSURE: 87 MMHG | BODY MASS INDEX: 23.79 KG/M2 | WEIGHT: 166.2 LBS | HEIGHT: 70 IN

## 2024-08-13 DIAGNOSIS — Z00.00 ENCOUNTER FOR MEDICARE ANNUAL WELLNESS EXAM: Primary | ICD-10-CM

## 2024-08-13 DIAGNOSIS — Z29.11 NEED FOR VACCINATION AGAINST RESPIRATORY SYNCYTIAL VIRUS: ICD-10-CM

## 2024-08-13 DIAGNOSIS — E78.2 MIXED HYPERLIPIDEMIA: ICD-10-CM

## 2024-08-13 DIAGNOSIS — Z12.5 SCREENING FOR PROSTATE CANCER: ICD-10-CM

## 2024-08-13 DIAGNOSIS — R73.03 PREDIABETES: ICD-10-CM

## 2024-08-13 DIAGNOSIS — N40.1 BENIGN PROSTATIC HYPERPLASIA WITH NOCTURIA: ICD-10-CM

## 2024-08-13 DIAGNOSIS — R35.1 BENIGN PROSTATIC HYPERPLASIA WITH NOCTURIA: ICD-10-CM

## 2024-08-13 DIAGNOSIS — I71.43 INFRARENAL ABDOMINAL AORTIC ANEURYSM (AAA) WITHOUT RUPTURE (H): ICD-10-CM

## 2024-08-13 LAB
ALBUMIN SERPL BCG-MCNC: 4.2 G/DL (ref 3.5–5.2)
ALP SERPL-CCNC: 85 U/L (ref 40–150)
ALT SERPL W P-5'-P-CCNC: 17 U/L (ref 0–70)
ANION GAP SERPL CALCULATED.3IONS-SCNC: 11 MMOL/L (ref 7–15)
AST SERPL W P-5'-P-CCNC: 20 U/L (ref 0–45)
BILIRUB SERPL-MCNC: 0.5 MG/DL
BUN SERPL-MCNC: 19.4 MG/DL (ref 8–23)
CALCIUM SERPL-MCNC: 9.6 MG/DL (ref 8.8–10.4)
CHLORIDE SERPL-SCNC: 107 MMOL/L (ref 98–107)
CHOLEST SERPL-MCNC: 231 MG/DL
CREAT SERPL-MCNC: 1.11 MG/DL (ref 0.67–1.17)
EGFRCR SERPLBLD CKD-EPI 2021: 71 ML/MIN/1.73M2
FASTING STATUS PATIENT QL REPORTED: YES
FASTING STATUS PATIENT QL REPORTED: YES
GLUCOSE SERPL-MCNC: 99 MG/DL (ref 70–99)
HBA1C MFR BLD: 5.7 % (ref 0–5.6)
HCO3 SERPL-SCNC: 23 MMOL/L (ref 22–29)
HDLC SERPL-MCNC: 45 MG/DL
LDLC SERPL CALC-MCNC: 160 MG/DL
NONHDLC SERPL-MCNC: 186 MG/DL
POTASSIUM SERPL-SCNC: 4.2 MMOL/L (ref 3.4–5.3)
PROT SERPL-MCNC: 7.3 G/DL (ref 6.4–8.3)
PSA SERPL DL<=0.01 NG/ML-MCNC: 1.21 NG/ML (ref 0–6.5)
SODIUM SERPL-SCNC: 141 MMOL/L (ref 135–145)
TRIGL SERPL-MCNC: 132 MG/DL

## 2024-08-13 PROCEDURE — 80061 LIPID PANEL: CPT | Performed by: FAMILY MEDICINE

## 2024-08-13 PROCEDURE — 80053 COMPREHEN METABOLIC PANEL: CPT | Performed by: FAMILY MEDICINE

## 2024-08-13 PROCEDURE — 36415 COLL VENOUS BLD VENIPUNCTURE: CPT | Performed by: FAMILY MEDICINE

## 2024-08-13 PROCEDURE — 83036 HEMOGLOBIN GLYCOSYLATED A1C: CPT | Performed by: FAMILY MEDICINE

## 2024-08-13 PROCEDURE — G0103 PSA SCREENING: HCPCS | Performed by: FAMILY MEDICINE

## 2024-08-13 PROCEDURE — G0439 PPPS, SUBSEQ VISIT: HCPCS | Performed by: FAMILY MEDICINE

## 2024-08-13 RX ORDER — TAMSULOSIN HYDROCHLORIDE 0.4 MG/1
0.4 CAPSULE ORAL DAILY
Qty: 90 CAPSULE | Refills: 3 | Status: SHIPPED | OUTPATIENT
Start: 2024-10-10

## 2024-08-13 SDOH — HEALTH STABILITY: PHYSICAL HEALTH: ON AVERAGE, HOW MANY DAYS PER WEEK DO YOU ENGAGE IN MODERATE TO STRENUOUS EXERCISE (LIKE A BRISK WALK)?: 7 DAYS

## 2024-08-13 SDOH — HEALTH STABILITY: PHYSICAL HEALTH: ON AVERAGE, HOW MANY MINUTES DO YOU ENGAGE IN EXERCISE AT THIS LEVEL?: 30 MIN

## 2024-08-13 ASSESSMENT — SOCIAL DETERMINANTS OF HEALTH (SDOH): HOW OFTEN DO YOU GET TOGETHER WITH FRIENDS OR RELATIVES?: ONCE A WEEK

## 2024-08-13 ASSESSMENT — PAIN SCALES - GENERAL: PAINLEVEL: NO PAIN (0)

## 2024-08-13 NOTE — PROGRESS NOTES
Preventive Care Visit  [facility]  Jean Pierre Payne DO, Family Medicine  Aug 13, 2024      Assessment & Plan     Encounter for Medicare annual wellness exam  I encouraged him to stay physically active and eat a healthy diet.  We are going to check fasting labs as listed below.    Mixed hyperlipidemia  He does not want to take a statin medication.  We discussed that his 10-year ASCVD risk score is 23.9% and decided to check a CT coronary calcium scan.  This was ordered last year, but it was not completed.  He reports wanting to do the test this year and he would consider starting medication if the result is abnormal.  - Lipid panel reflex to direct LDL Fasting; Future  - CT Coronary Calcium Scan; Future    Benign prostatic hyperplasia with nocturia  He was given refills of Flomax which continues to help with BPH symptoms.  - tamsulosin (FLOMAX) 0.4 MG capsule; Take 1 capsule (0.4 mg) by mouth daily    Prediabetes  This issue has been stable with lifestyle modifications.  Last year his hemoglobin A1c was 6.1%.  - Comprehensive metabolic panel (BMP + Alb, Alk Phos, ALT, AST, Total. Bili, TP); Future  - Hemoglobin A1c; Future    Infrarenal abdominal aortic aneurysm (AAA) without rupture (H24)  Last year he had a screening abdominal ultrasound which showed a 3.1 cm infrarenal abdominal aortic aneurysm.  We are rechecking the ultrasound this year and we will refer to a vascular specialist if the size continues to increase.  - US Abdominal Aorta Imaging; Future    Need for vaccination against respiratory syncytial virus  - RSV vaccine, bivalent, ABRYSVO, injection; Inject 0.5 mLs into the muscle once for 1 dose Pharmacist administered    Screening for prostate cancer  - PSA, screen; Future    Patient has been advised of split billing requirements and indicates understanding: Yes        Counseling  Appropriate preventive services were addressed with this patient via screening, questionnaire, or  discussion as appropriate for fall prevention, nutrition, physical activity, Tobacco-use cessation, weight loss and cognition.  Checklist reviewing preventive services available has been given to the patient.  Reviewed patient's diet, addressing concerns and/or questions.           Emiliano Ordoñez is a 69 year old, presenting for the following:  Physical (AWV)        8/13/2024     9:17 AM   Additional Questions   Roomed by REJI Wolfe   Accompanied by N/A         8/13/2024     9:17 AM   Patient Reported Additional Medications   Patient reports taking the following new medications Pt denies         Health Care Directive  Patient does not have a Health Care Directive or Living Will: Discussed advance care planning with patient; however, patient declined at this time.    HPI    Wellness Visit Notes:  -Colon cancer screening done via Cologuard on 11/2022, due 11/2025 (impression: negative).   -PSA lab work performed 8/2023 (value of 1.43 ng/mL). Pt requesting lab work this visit.   -Immunizations: Advised Pt to complete RSV vaccine(s) at local pharmacy due to Medicare insurance coverage.  -Dermatology: Pt reports they does not meet with dermatology regularly.     He has a medical history significant for BPH, hyperlipidemia, abdominal aortic aneurysm and prediabetes.  He reports that his health seems good at this time.  He is getting regular exercise and trying to eat a healthy diet.  Last year his hemoglobin A1c was stable at 6.1% and the LDL came down to 145.  A CT coronary calcium scan was ordered last year, but this was not completed.  He has been prescribed statins in the past, but he does not want to take them.  He denies having side effects from these.    Social history: .  He is originally from [place].  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                8/13/2024   General Health   How would you rate your overall physical health? Good   Feel stress (tense, anxious, or unable to sleep) Not at all             8/13/2024   Nutrition   Diet: Low salt            8/13/2024   Exercise   Days per week of moderate/strenous exercise 7 days   Average minutes spent exercising at this level 30 min            8/13/2024   Social Factors   Frequency of gathering with friends or relatives Once a week   Worry food won't last until get money to buy more No   Food not last or not have enough money for food? No   Do you have housing? (Housing is defined as stable permanent housing and does not include staying ouside in a car, in a tent, in an abandoned building, in an overnight shelter, or couch-surfing.) Yes   Are you worried about losing your housing? No   Lack of transportation? No   Unable to get utilities (heat,electricity)? No            8/13/2024   Fall Risk   Fallen 2 or more times in the past year? No    No   Trouble with walking or balance? No    No       Multiple values from one day are sorted in reverse-chronological order          8/13/2024   Activities of Daily Living- Home Safety   Needs help with the following daily activites None of the above   Safety concerns in the home None of the above            8/13/2024   Dental   Dentist two times every year? Yes            8/13/2024   Hearing Screening   Hearing concerns? None of the above            8/13/2024   Driving Risk Screening   Patient/family members have concerns about driving No            8/13/2024   General Alertness/Fatigue Screening   Have you been more tired than usual lately? No            8/13/2024   Urinary Incontinence Screening   Bothered by leaking urine in past 6 months No            8/13/2024   TB Screening   Were you born outside of the [place]? Yes            Today's PHQ-2 Score:       8/13/2024     9:11 AM   PHQ-2 ( 1999 [employer])   Q1: Little interest or pleasure in doing things 0   Q2: Feeling down, depressed or hopeless 0   PHQ-2 Score 0   Q1: Little interest or pleasure in doing things Not at all   Q2: Feeling down, depressed or hopeless Not at  all   PHQ-2 Score 0           2024   Substance Use   Alcohol more than 3/day or more than 7/wk No   Do you have a current opioid prescription? No   How severe/bad is pain from 1 to 10? 0/10 (No Pain)   Do you use any other substances recreationally? No        Social History     Tobacco Use    Smoking status: Former     Current packs/day: 0.00     Average packs/day: 1 pack/day for 10.0 years (10.0 ttl pk-yrs)     Types: Cigarettes     Quit date:      Years since quittin.6    Smokeless tobacco: Never    Tobacco comments:     Stopped smoking way over 30 years ago,   Vaping Use    Vaping status: Never Used   Substance Use Topics    Alcohol use: Not Currently    Drug use: Never           2024   AAA Screening   Family history of Abdominal Aortic Aneurysm (AAA)? No      ASCVD Risk   The 10-year ASCVD risk score (Simran LOONEY, et al., 2019) is: 23.9%    Values used to calculate the score:      Age: 69 years      Sex: Male      Is Non- : No      Diabetic: No      Tobacco smoker: No      Systolic Blood Pressure: 136 mmHg      Is BP treated: No      HDL Cholesterol: 43 mg/dL      Total Cholesterol: 229 mg/dL            Reviewed and updated as needed this visit by Provider                      Current providers sharing in care for this patient include:  Patient Care Team:  Jean Pierre Barlow DO as PCP - General (Family Medicine)  Kyleigh Morales MD as Assigned PCP    The following health maintenance items are reviewed in Epic and correct as of today:  Health Maintenance   Topic Date Due    IPV IMMUNIZATION (2 of 3 - Adult catch-up series) 2008    RSV VACCINE (Pregnancy & 60+) (1 - 1-dose 60+ series) Never done    ANNUAL REVIEW OF HM ORDERS  10/06/2023    COVID-19 Vaccine ( - -24 season) 2024    LIPID  2024    INFLUENZA VACCINE (1) 2024    MEDICARE ANNUAL WELLNESS VISIT  2025    FALL RISK ASSESSMENT  2025    COLORECTAL CANCER  "SCREENING  11/14/2025    DTAP/TDAP/TD IMMUNIZATION (3 - Td or Tdap) 06/21/2026    GLUCOSE  08/07/2026    ADVANCE CARE PLANNING  08/13/2029    HEPATITIS C SCREENING  Completed    PHQ-2 (once per calendar year)  Completed    Pneumococcal Vaccine: 65+ Years  Completed    ZOSTER IMMUNIZATION  Completed    AORTIC ANEURYSM SCREENING (SYSTEM ASSIGNED)  Completed    HPV IMMUNIZATION  Aged Out    MENINGITIS IMMUNIZATION  Aged Out    RSV MONOCLONAL ANTIBODY  Aged Out         Review of Systems  Constitutional, HEENT, cardiovascular, pulmonary, GI, , musculoskeletal, neuro, skin, endocrine and psych systems are negative, except as otherwise noted.     Objective    Exam  /87 (BP Location: Left arm, Patient Position: Sitting, Cuff Size: Adult Regular)   Pulse 72   Temp 97.7  F (36.5  C) (Temporal)   Resp 16   Ht 1.784 m (5' 10.25\")   Wt 75.4 kg (166 lb 3.2 oz)   SpO2 96%   BMI 23.68 kg/m     Estimated body mass index is 23.68 kg/m  as calculated from the following:    Height as of this encounter: 1.784 m (5' 10.25\").    Weight as of this encounter: 75.4 kg (166 lb 3.2 oz).    Physical Exam  GENERAL: alert and no distress  EYES: Eyes grossly normal to inspection, PERRL and conjunctivae and sclerae normal  HENT: ear canals and TM's normal, nose and mouth without ulcers or lesions  NECK: no adenopathy, no asymmetry, masses, or scars  RESP: lungs clear to auscultation - no rales, rhonchi or wheezes  CV: regular rate and rhythm, normal S1 S2, no S3 or S4, no murmur, click or rub, no peripheral edema  ABDOMEN: soft, nontender, no hepatosplenomegaly, no masses and bowel sounds normal  MS: no gross musculoskeletal defects noted, no edema  SKIN: no suspicious lesions or rashes  NEURO: Normal strength and tone, mentation intact and speech normal  PSYCH: mentation appears normal, affect normal/bright         8/13/2024   Mini Cog   Clock Draw Score 2 Normal   3 Item Recall 3 objects recalled   Mini Cog Total Score 5    "              Signed Electronically by: Jean Pierre Payne, DO

## 2024-08-13 NOTE — PATIENT INSTRUCTIONS
Patient Education   Preventive Care Advice   This is general advice given by our system to help you stay healthy. However, your care team may have specific advice just for you. Please talk to your care team about your preventive care needs.  Nutrition  Eat 5 or more servings of fruits and vegetables each day.  Try wheat bread, brown rice and whole grain pasta (instead of white bread, rice, and pasta).  Get enough calcium and vitamin D. Check the label on foods and aim for 100% of the RDA (recommended daily allowance).  Lifestyle  Exercise at least 150 minutes each week  (30 minutes a day, 5 days a week).  Do muscle strengthening activities 2 days a week. These help control your weight and prevent disease.  No smoking.  Wear sunscreen to prevent skin cancer.  Have a dental exam and cleaning every 6 months.  Yearly exams  See your health care team every year to talk about:  Any changes in your health.  Any medicines your care team has prescribed.  Preventive care, family planning, and ways to prevent chronic diseases.  Shots (vaccines)   HPV shots (up to age 26), if you've never had them before.  Hepatitis B shots (up to age 59), if you've never had them before.  COVID-19 shot: Get this shot when it's due.  Flu shot: Get a flu shot every year.  Tetanus shot: Get a tetanus shot every 10 years.  Pneumococcal, hepatitis A, and RSV shots: Ask your care team if you need these based on your risk.  Shingles shot (for age 50 and up)  General health tests  Diabetes screening:  Starting at age 35, Get screened for diabetes at least every 3 years.  If you are younger than age 35, ask your care team if you should be screened for diabetes.  Cholesterol test: At age 39, start having a cholesterol test every 5 years, or more often if advised.  Bone density scan (DEXA): At age 50, ask your care team if you should have this scan for osteoporosis (brittle bones).  Hepatitis C: Get tested at least once in your life.  STIs (sexually  transmitted infections)  Before age 24: Ask your care team if you should be screened for STIs.  After age 24: Get screened for STIs if you're at risk. You are at risk for STIs (including HIV) if:  You are sexually active with more than one person.  You don't use condoms every time.  You or a partner was diagnosed with a sexually transmitted infection.  If you are at risk for HIV, ask about PrEP medicine to prevent HIV.  Get tested for HIV at least once in your life, whether you are at risk for HIV or not.  Cancer screening tests  Cervical cancer screening: If you have a cervix, begin getting regular cervical cancer screening tests starting at age 21.  Breast cancer scan (mammogram): If you've ever had breasts, begin having regular mammograms starting at age 40. This is a scan to check for breast cancer.  Colon cancer screening: It is important to start screening for colon cancer at age 45.  Have a colonoscopy test every 10 years (or more often if you're at risk) Or, ask your provider about stool tests like a FIT test every year or Cologuard test every 3 years.  To learn more about your testing options, visit:   .  For help making a decision, visit:   https://bit.ly/qu20764.  Prostate cancer screening test: If you have a prostate, ask your care team if a prostate cancer screening test (PSA) at age 55 is right for you.  Lung cancer screening: If you are a current or former smoker ages 50 to 80, ask your care team if ongoing lung cancer screenings are right for you.  For informational purposes only. Not to replace the advice of your health care provider. Copyright   2023 [employer]. All rights reserved. Clinically reviewed by the [facility] Transitions Program. [employer] 536932 - REV 01/24.

## 2024-08-13 NOTE — PROGRESS NOTES
Preventive Care Visit  [facility]  Jean Pierre Payne DO, Family Medicine  Aug 13, 2024  {Provider  Link to SmartSet :175605}    {PROVIDER CHARTING PREFERENCE:301169}    Emiliano Ordoñez is a 69 year old, presenting for the following:  No chief complaint on file.    {(!) Visit Details have not yet been documented.  Please enter Visit Details and then use this list to pull in documentation. (Optional):448134}  {ROOMER if patient is in their first year of Medicare a vision screen is required click here to document the Vison screen and then refresh the note to pull in results  :918313}    Health Care Directive  Patient does not have a Health Care Directive or Living Will: {ADVANCE_DIRECTIVE_STATUS:071822}    [facility]    Wellness Visit Notes:  -Colon cancer screening done via Cologuard on 11/2022, due 11/2025 (impression: negative).   -PSA lab work performed 8/2023 (value of 1.43 ng/mL). Discussed risks/benefits of PSA testing today in detail, including possibility of false positive results and/or possibility of biopsy recommended as next step. Pt {PSA decision:517113}  -Immunizations: Advised Pt to complete RSV vaccine(s) at local pharmacy due to Medicare insurance coverage.  -Education: Pt education provided on AVS for: {Education List:679820}.   -Dermatology: Pt reports they *** meet with dermatology regularly.     ***  {MA/LPN/RN Pre-Provider Visit Orders- hCG/UA/Strep (Optional):916124}  {SUPERLIST (Optional):465508}  {additonal problems for provider to add (Optional):798748}      7/31/2023   General Health   How would you rate your overall physical health? Good            7/31/2023   Nutrition   At least 4 servings of fruits and vegetables/day Yes            7/31/2023   Exercise   Frequency of exercise: 2-3 days/week               7/31/2023   Activities of Daily Living- Home Safety   Needs help with the following daily activites NO assistance is needed   Safety concerns in the home None of  the above             No data to display                  7/31/2023   Hearing Screening   Hearing concerns? No concerns               No data to display                   {Rooming Staff Patient needs a PHQ as part of the AWV.  Use this link to complete and then refresh the note to pull results Link to PHQ2 Assessment :640636}  {USE TO PULL IN PHQ RESULTS FOR TODAY:358698}      7/31/2023   Substance Use   Alcohol more than 3/day or more than 7/wk No        Social History     Tobacco Use    Smoking status: Former     Current packs/day: 1.00     Average packs/day: 1 pack/day for 10.0 years (10.0 ttl pk-yrs)     Types: Cigarettes    Smokeless tobacco: Never    Tobacco comments:     Stopped smoking way over 30 years ago     1 to 2 packs x 15 years.     One pack during the weekdays and 2 packs on weekend.    Vaping Use    Vaping status: Never Used   Substance Use Topics    Alcohol use: Not Currently    Drug use: Never     {Provider  If there are gaps in the social history shown above, please follow the link to update and then refresh the note Link to Social and Substance History :210601}  ASCVD Risk   The ASCVD Risk score (Simran LOONEY, et al., 2019) failed to calculate for the following reasons:    The systolic blood pressure is missing    {Link to Fracture Risk Assessment Tool (Optional):381462}    {Provider  REQUIRED FOR AWV Use the storyboard to review patient history, after sections have been marked as reviewed, refresh note to capture documentation:108913}  {Provider   REQUIRED AWV use this link to review and update sexual activity history  after section has been marked as reviewed, refresh note to capture documentation:801619}  Reviewed and updated as needed this visit by Provider                    {HISTORY OPTIONS (Optional):438624}  Current providers sharing in care for this patient include:  Patient Care Team:  Jean Pierre Barlow DO as PCP - General (Family Medicine)  Kyleigh Morales MD as  "Assigned PCP    The following health maintenance items are reviewed in Epic and correct as of today:  Health Maintenance   Topic Date Due    IPV IMMUNIZATION (2 of 3 - Adult catch-up series) 02/12/2008    RSV VACCINE (Pregnancy & 60+) (1 - 1-dose 60+ series) Never done    ANNUAL REVIEW OF HM ORDERS  10/06/2023    PHQ-2 (once per calendar year)  01/01/2024    COVID-19 Vaccine (7 - 2023-24 season) 04/04/2024    FALL RISK ASSESSMENT  07/31/2024    LIPID  08/07/2024    MEDICARE ANNUAL WELLNESS VISIT  07/31/2024    INFLUENZA VACCINE (1) 09/01/2024    COLORECTAL CANCER SCREENING  11/14/2025    DTAP/TDAP/TD IMMUNIZATION (3 - Td or Tdap) 06/21/2026    GLUCOSE  08/07/2026    ADVANCE CARE PLANNING  07/31/2028    HEPATITIS C SCREENING  Completed    Pneumococcal Vaccine: 65+ Years  Completed    ZOSTER IMMUNIZATION  Completed    HPV IMMUNIZATION  Aged Out    MENINGITIS IMMUNIZATION  Aged Out    RSV MONOCLONAL ANTIBODY  Aged Out    LUNG CANCER SCREENING  Discontinued       {ROS Picklists (Optional):371194}     Objective    Exam  There were no vitals taken for this visit.   Estimated body mass index is 24.01 kg/m  as calculated from the following:    Height as of 7/31/23: 1.797 m (5' 10.76\").    Weight as of 7/31/23: 77.6 kg (171 lb).    Physical Exam  {Exam Choices (Optional):258873}  {Provider  The Mini-Cog is incomplete, use link to complete and refresh note Link to Mini-Cog :694191}       No data to display              {A Mini-Cog total score of 0-2 suggests the possibility of dementia, score of 3-5 suggests no dementia:754918}         Signed Electronically by: Jean Pierre Payne, DO  {Email feedback regarding this note to [email or web address]   :485986}  "

## 2024-08-19 ENCOUNTER — HOSPITAL ENCOUNTER (OUTPATIENT)
Dept: ULTRASOUND IMAGING | Facility: CLINIC | Age: 69
Discharge: HOME | End: 2024-08-19
Attending: FAMILY MEDICINE | Admitting: FAMILY MEDICINE
Payer: COMMERCIAL

## 2024-08-19 DIAGNOSIS — I71.43 INFRARENAL ABDOMINAL AORTIC ANEURYSM (AAA) WITHOUT RUPTURE (H): ICD-10-CM

## 2024-08-19 PROCEDURE — 76775 US EXAM ABDO BACK WALL LIM: CPT | Mod: 26 | Performed by: RADIOLOGY

## 2024-08-19 PROCEDURE — 76775 US EXAM ABDO BACK WALL LIM: CPT

## 2024-08-20 ENCOUNTER — TELEPHONE (OUTPATIENT)
Dept: FAMILY MEDICINE | Facility: CLINIC | Age: 69
End: 2024-08-20
Payer: COMMERCIAL

## 2024-08-20 DIAGNOSIS — I71.40 ABDOMINAL AORTIC ANEURYSM (AAA) WITHOUT RUPTURE, UNSPECIFIED PART (H): Primary | ICD-10-CM

## 2024-08-20 NOTE — TELEPHONE ENCOUNTER
----- Message from Jean Pierre Payne sent at 8/20/2024 10:27 AM CDT -----  Please let this patient know that the abdominal aortic aneurysm increased in size from 3.1 cm to 3.6 cm.  I am going to put in a referral for him to see a vascular specialist about this.

## 2024-08-20 NOTE — TELEPHONE ENCOUNTER
Left message for patient to call [facility] back  When patient calls back please transfer to an RN     Please call to schedule your appointment  [facility]  [address] [facility]  Phone: [phone]     Thanks,  Marylu OLIVARES, RN

## 2024-08-21 ENCOUNTER — TELEPHONE (OUTPATIENT)
Dept: VASCULAR SURGERY | Facility: CLINIC | Age: 69
End: 2024-08-21
Payer: COMMERCIAL

## 2024-08-21 NOTE — TELEPHONE ENCOUNTER
Left Voicemail (1st Attempt) and Sent Mychart (1st Attempt) for the patient to call back and schedule the following:Abdominal aortic aneurysm (AAA) without rupture,    Location: [facility]  Provider: Dr. Sandie Currie or Tayler Piedra   Appointment type: New Vascular Patient    Appointment mode: In Person  Return date: Next Available     Specialty phone number: [phone] or [phone]    Referred to [facility]: No    Is Imaging Needed: No    Additional Notes: KISHA Polanco on 8/21/2024 at 1:10 PM

## 2024-08-22 NOTE — TELEPHONE ENCOUNTER
ANNY BURNETT  Appears patient received message and is scheduled for follow up with Vascular on 10/7/24  Thanks,  Marylu OLIVARES RN

## 2024-08-23 ENCOUNTER — HOSPITAL ENCOUNTER (OUTPATIENT)
Dept: CT IMAGING | Facility: CLINIC | Age: 69
Discharge: HOME | End: 2024-08-23
Attending: FAMILY MEDICINE | Admitting: FAMILY MEDICINE
Payer: COMMERCIAL

## 2024-08-23 DIAGNOSIS — E78.2 MIXED HYPERLIPIDEMIA: ICD-10-CM

## 2024-08-23 PROCEDURE — 75571 CT HRT W/O DYE W/CA TEST: CPT | Mod: 26 | Performed by: INTERNAL MEDICINE

## 2024-08-23 PROCEDURE — 75571 CT HRT W/O DYE W/CA TEST: CPT

## 2024-08-27 ENCOUNTER — MYC MEDICAL ADVICE (OUTPATIENT)
Dept: FAMILY MEDICINE | Facility: CLINIC | Age: 69
End: 2024-08-27
Payer: COMMERCIAL

## 2024-08-27 DIAGNOSIS — E78.2 MIXED HYPERLIPIDEMIA: Primary | ICD-10-CM

## 2024-08-28 RX ORDER — ROSUVASTATIN CALCIUM 20 MG/1
20 TABLET, COATED ORAL DAILY
Qty: 90 TABLET | Refills: 3 | Status: SHIPPED | OUTPATIENT
Start: 2024-08-28

## 2024-08-28 RX ORDER — ASPIRIN 81 MG/1
81 TABLET ORAL DAILY
Qty: 90 TABLET | Refills: 3 | Status: SHIPPED | OUTPATIENT
Start: 2024-08-28

## 2024-08-30 NOTE — TELEPHONE ENCOUNTER
DE,    Please see below MyChart message and advise.  Patient informed to receive RSV from pharmacy due to Medicare Insurance Coverage.    Thanks,  Francis RODRIGEZ RN

## 2024-10-07 ENCOUNTER — OFFICE VISIT (OUTPATIENT)
Dept: VASCULAR SURGERY | Facility: CLINIC | Age: 69
End: 2024-10-07
Payer: COMMERCIAL

## 2024-10-07 VITALS
WEIGHT: 167.6 LBS | SYSTOLIC BLOOD PRESSURE: 127 MMHG | OXYGEN SATURATION: 96 % | BODY MASS INDEX: 23.88 KG/M2 | DIASTOLIC BLOOD PRESSURE: 82 MMHG | HEART RATE: 89 BPM

## 2024-10-07 DIAGNOSIS — I71.40 ABDOMINAL AORTIC ANEURYSM (AAA) 3.0 CM TO 5.5 CM IN DIAMETER IN MALE (H): Primary | ICD-10-CM

## 2024-10-07 DIAGNOSIS — I71.40 ABDOMINAL AORTIC ANEURYSM (AAA) WITHOUT RUPTURE, UNSPECIFIED PART (H): ICD-10-CM

## 2024-10-07 PROCEDURE — 99203 OFFICE O/P NEW LOW 30 MIN: CPT | Performed by: NURSE PRACTITIONER

## 2024-10-07 ASSESSMENT — PAIN SCALES - GENERAL: PAINLEVEL: NO PAIN (0)

## 2024-10-07 NOTE — NURSING NOTE
Chief Complaint   Patient presents with    Follow Up     10/7/2024 visit with Tayler Piedra APRN CNP for RETURN VASCULAR PATIENT - Abdominal aortic aneurysm (AAA) without rupture       Vitals were taken and medications reconciled.    Esau Haynes, EMT  1:00 PM

## 2024-10-07 NOTE — PROGRESS NOTES
VASCULAR SURGERY PROGRESS NOTE    LOCATION: [facility]     Tiago Torres  Medical Record #:  [id number]  YOB: 1955  Age:  69 year old     Date of Service: 10/7/2024    PRIMARY CARE PROVIDER: Jean Pierre Barlow    Reason for visit: New AAA     IMPRESSION / RECOMMENDATION:   Tiago Torres is a pleasant 69 years gentleman who recently underwent AAA screening and was found to have a 3.6 cm distal abdominal aortic aneurysm, asymptomatic. During this clinic visit we discussed the physiology of AAAs, very low risk of rupture at this size, and criteria for surgical intervention which is at 5.5 cm.     - Continue low dose aspirin and statin daily  - Recommend normotension  - Follow up in 6 months with repeat US to ensure no rapid growth is present (was 3.1 cm in 2023). If the AAA remains stable, will recommend annual follow up     All questions were answered and support provided. He has our contact information and knows to reach out with any additional questions or concerns.     Tayler Piedra [facility]  Vascular Surgery  Pager: [phone]  [email or web address]  Send message or 10 digit call back number Securely via [employer] with the [employer] Web Console (learn more here)        HPI:  Tiago Torres is a 69 year old healthy male with only past medical history of HLD on statin. Asymptomatic, normotensive, recently found to have AAA at 3.6 cm, in 2023 was 3.1 cm.    REVIEW OF SYSTEMS:    A 12 point ROS was reviewed and is negative except for what is listed above in HPI.    PHH:    Past Medical History:   Diagnosis Date    Pure hypercholesterolemia 11/05/2019          Past Surgical History:   Procedure Laterality Date    ZZC APPENDECTOMY      Description: Appendectomy;  Recorded: 12/11/2008;       ALLERGIES:  Patient has no known allergies.    MEDS:    Current Outpatient Medications:     aspirin 81 MG EC tablet, Take 1 tablet (81 mg) by mouth daily., Disp: 90 tablet, Rfl: 3    rosuvastatin  (CRESTOR) 20 MG tablet, Take 1 tablet (20 mg) by mouth daily., Disp: 90 tablet, Rfl: 3    [START ON 10/10/2024] tamsulosin (FLOMAX) 0.4 MG capsule, Take 1 capsule (0.4 mg) by mouth daily, Disp: 90 capsule, Rfl: 3    SOCIAL HABITS:    History   Smoking Status    Former    Types: Cigarettes   Smokeless Tobacco    Never     Social History    Substance and Sexual Activity      Alcohol use: Not Currently      History   Drug Use Unknown       FAMILY HISTORY:    Family History   Problem Relation Age of Onset    Asthma Mother     Liver Cancer Father        PE:  /82 (BP Location: Left arm, Patient Position: Chair, Cuff Size: Adult Regular)   Pulse 89   Wt 76 kg (167 lb 9.6 oz)   SpO2 96%   BMI 23.88 kg/m    Wt Readings from Last 1 Encounters:   10/07/24 76 kg (167 lb 9.6 oz)     Body mass index is 23.88 kg/m .    EXAM:  General: No apparent distress. Answers questions appropriately.   Neurologic: Focally intact, Alert and oriented x 3.   Eyes: Grossly normal.   Cardiac: RRR by radial pulse  Pulmonary: Non labored breathing with normal respiratory effort  Abdominal: Soft, non distended, non tender to palpation. No pulsatile mass.   Musculoskeletal/Extremity: Palpable pulses. 5/5 gross bilateral upper and lower extremity strength. No edema. No open wounds or abrasions.       DIAGNOSTIC STUDIES:     Images:  ULTRASOUND ABDOMINAL AORTA 8/19/2024 10:28 AM     CLINICAL HISTORY: Infrarenal abdominal aortic aneurysm (AAA) without  rupture (H24). Screening abdominal aortic ultrasound requested.     COMPARISONS: Ultrasound aorta Medicare AAA screening 8/8/2023 .     ORDERING PROVIDER: DION PALACIOS     TECHNIQUE: Grayscale images were obtained through the aorta and common  iliac arteries. Diameters were measured in the longitudinal and  transverse planes. Arteries evaluated with color Doppler and Doppler  waveform ultrasound.     FINDINGS: Artery diameters (longitudinal / transverse):     Aorta:       Proximal: 2.3  cm /  2.3 x 1.7 cm, 94/21 cm/s, triphasic       Mid: 2.4 cm /  2.1 x 2.0 cm, 91/6 cm/s, triphasic       Distal: 3.3 cm x 4.7 cm long /  3.6 x 3.3 cm, 50/6 cm/s,  triphasic     Right common iliac artery: 1.0 cm /  -- cm, 184/0 cm/s, triphasic     Left common iliac artery: 1.2 cm /  -- cm, 113/0 cm/s, triphasic                                                                      IMPRESSION:  1. Distal abdominal aortic aneurysm measures up to 3.6 cm in diameter,  previously 3.1 cm.    LABS:      Sodium   Date Value Ref Range Status   08/13/2024 141 135 - 145 mmol/L Final   08/07/2023 141 136 - 145 mmol/L Final   10/06/2022 140 133 - 144 mmol/L Final     Urea Nitrogen   Date Value Ref Range Status   08/13/2024 19.4 8.0 - 23.0 mg/dL Final   08/07/2023 14.9 8.0 - 23.0 mg/dL Final   10/06/2022 23 7 - 30 mg/dL Final     Hemoglobin   Date Value Ref Range Status   08/07/2023 15.2 13.3 - 17.7 g/dL Final   10/06/2022 14.9 13.3 - 17.7 g/dL Final     Platelet Count   Date Value Ref Range Status   08/07/2023 195 150 - 450 10e3/uL Final   10/06/2022 190 150 - 450 10e3/uL Final

## 2024-10-07 NOTE — LETTER
10/7/2024       RE: Tiago Torres  [address]  [facility] [address]     Dear Colleague,    Thank you for referring your patient, Tiago Torres, to the [facility]ULAR [facility] [place] [facility]. Please see a copy of my visit note below.        VASCULAR SURGERY PROGRESS NOTE    LOCATION: [facility]     Tiago Torres  Medical Record #:  [id number]  YOB: 1955  Age:  69 year old     Date of Service: 10/7/2024    PRIMARY CARE PROVIDER: Jean Pierre Barlow    Reason for visit: New AAA     IMPRESSION / RECOMMENDATION:   Tiago Torres is a pleasant 69 years gentleman who recently underwent AAA screening and was found to have a 3.6 cm distal abdominal aortic aneurysm, asymptomatic. During this clinic visit we discussed the physiology of AAAs, very low risk of rupture at this size, and criteria for surgical intervention which is at 5.5 cm.     - Continue low dose aspirin and statin daily  - Recommend normotension  - Follow up in 6 months with repeat US to ensure no rapid growth is present (was 3.1 cm in 2023). If the AAA remains stable, will recommend annual follow up     All questions were answered and support provided. He has our contact information and knows to reach out with any additional questions or concerns.     Tayler Piedra, TELMA  Vascular Surgery  Pager: [phone]  [email or web address]  Send message or 10 digit call back number Securely via [employer] with the [employer] Web Console (learn more here)        HPI:  Tiago Torres is a 69 year old healthy male with only past medical history of HLD on statin. Asymptomatic, normotensive, recently found to have AAA at 3.6 cm, in 2023 was 3.1 cm.    REVIEW OF SYSTEMS:    A 12 point ROS was reviewed and is negative except for what is listed above in HPI.    PHH:    Past Medical History:   Diagnosis Date     Pure hypercholesterolemia 11/05/2019          Past Surgical  History:   Procedure Laterality Date     [facility] APPENDECTOMY      Description: Appendectomy;  Recorded: 12/11/2008;       ALLERGIES:  Patient has no known allergies.    MEDS:    Current Outpatient Medications:      aspirin 81 MG EC tablet, Take 1 tablet (81 mg) by mouth daily., Disp: 90 tablet, Rfl: 3     rosuvastatin (CRESTOR) 20 MG tablet, Take 1 tablet (20 mg) by mouth daily., Disp: 90 tablet, Rfl: 3     [START ON 10/10/2024] tamsulosin (FLOMAX) 0.4 MG capsule, Take 1 capsule (0.4 mg) by mouth daily, Disp: 90 capsule, Rfl: 3    SOCIAL HABITS:    History   Smoking Status     Former     Types: Cigarettes   Smokeless Tobacco     Never     Social History    Substance and Sexual Activity      Alcohol use: Not Currently      History   Drug Use Unknown       FAMILY HISTORY:    Family History   Problem Relation Age of Onset     Asthma Mother      Liver Cancer Father        PE:  /82 (BP Location: Left arm, Patient Position: Chair, Cuff Size: Adult Regular)   Pulse 89   Wt 76 kg (167 lb 9.6 oz)   SpO2 96%   BMI 23.88 kg/m    Wt Readings from Last 1 Encounters:   10/07/24 76 kg (167 lb 9.6 oz)     Body mass index is 23.88 kg/m .    EXAM:  General: No apparent distress. Answers questions appropriately.   Neurologic: Focally intact, Alert and oriented x 3.   Eyes: Grossly normal.   Cardiac: RRR by radial pulse  Pulmonary: Non labored breathing with normal respiratory effort  Abdominal: Soft, non distended, non tender to palpation. No pulsatile mass.   Musculoskeletal/Extremity: Palpable pulses. 5/5 gross bilateral upper and lower extremity strength. No edema. No open wounds or abrasions.       DIAGNOSTIC STUDIES:     Images:  ULTRASOUND ABDOMINAL AORTA 8/19/2024 10:28 AM     CLINICAL HISTORY: Infrarenal abdominal aortic aneurysm (AAA) without  rupture (H24). Screening abdominal aortic ultrasound requested.     COMPARISONS: Ultrasound aorta Medicare AAA screening 8/8/2023 .     ORDERING PROVIDER: DION GE  ELSNER     TECHNIQUE: Grayscale images were obtained through the aorta and common  iliac arteries. Diameters were measured in the longitudinal and  transverse planes. Arteries evaluated with color Doppler and Doppler  waveform ultrasound.     FINDINGS: Artery diameters (longitudinal / transverse):     Aorta:       Proximal: 2.3 cm /  2.3 x 1.7 cm, 94/21 cm/s, triphasic       Mid: 2.4 cm /  2.1 x 2.0 cm, 91/6 cm/s, triphasic       Distal: 3.3 cm x 4.7 cm long /  3.6 x 3.3 cm, 50/6 cm/s,  triphasic     Right common iliac artery: 1.0 cm /  -- cm, 184/0 cm/s, triphasic     Left common iliac artery: 1.2 cm /  -- cm, 113/0 cm/s, triphasic                                                                      IMPRESSION:  1. Distal abdominal aortic aneurysm measures up to 3.6 cm in diameter,  previously 3.1 cm.    LABS:      Sodium   Date Value Ref Range Status   08/13/2024 141 135 - 145 mmol/L Final   08/07/2023 141 136 - 145 mmol/L Final   10/06/2022 140 133 - 144 mmol/L Final     Urea Nitrogen   Date Value Ref Range Status   08/13/2024 19.4 8.0 - 23.0 mg/dL Final   08/07/2023 14.9 8.0 - 23.0 mg/dL Final   10/06/2022 23 7 - 30 mg/dL Final     Hemoglobin   Date Value Ref Range Status   08/07/2023 15.2 13.3 - 17.7 g/dL Final   10/06/2022 14.9 13.3 - 17.7 g/dL Final     Platelet Count   Date Value Ref Range Status   08/07/2023 195 150 - 450 10e3/uL Final   10/06/2022 190 150 - 450 10e3/uL Final         Again, thank you for allowing me to participate in the care of your patient.      Sincerely,    MERCY Bundy CNP

## 2024-10-09 NOTE — PATIENT INSTRUCTIONS
Thank you so much for choosing us for your care. It was a pleasure to see you at the vascular clinic today.     Follow-up recommendations: We will see you back in 6 months. Please do not hesitate to reach out to us in the meantime with any concerns. Our schedulers will get in touch with you to set up your follow-up visit.     Additional testing/imaging ordered today: Repeat abodminal US .        Our scheduling team will get in touch with you to set up any follow-up testing/imaging and/or appointments. Please be aware that any testing/imaging recommended today will need to completed prior to your next visit with the provider. If testing/imaging is not completed prior to your next visit, your visit may be rescheduled.        If you have any questions, please contact our clinic directly at [phone] and ask for the nurse. We also encourage the use of [employer] to communicate with your HealthCare Provider.        If you have an urgent need after business hours (8:00 am to 4:30 pm) please call [phone], option 4, and ask for the vascular attending on call. For non-urgent after hours needs, please call the vascular nurse at [phone] and leave a detailed voicemail. For scheduling needs, please call our clinic directly at [phone].

## 2025-04-06 ENCOUNTER — HEALTH MAINTENANCE LETTER (OUTPATIENT)
Age: 70
End: 2025-04-06

## 2025-04-08 ENCOUNTER — OFFICE VISIT (OUTPATIENT)
Dept: VASCULAR SURGERY | Facility: CLINIC | Age: 70
End: 2025-04-08
Payer: COMMERCIAL

## 2025-04-08 ENCOUNTER — ANCILLARY PROCEDURE (OUTPATIENT)
Dept: ULTRASOUND IMAGING | Facility: CLINIC | Age: 70
End: 2025-04-08
Attending: NURSE PRACTITIONER
Payer: COMMERCIAL

## 2025-04-08 VITALS
HEART RATE: 103 BPM | DIASTOLIC BLOOD PRESSURE: 68 MMHG | OXYGEN SATURATION: 97 % | WEIGHT: 166.2 LBS | SYSTOLIC BLOOD PRESSURE: 105 MMHG | BODY MASS INDEX: 23.68 KG/M2

## 2025-04-08 DIAGNOSIS — I71.40 ABDOMINAL AORTIC ANEURYSM (AAA) WITHOUT RUPTURE, UNSPECIFIED PART: ICD-10-CM

## 2025-04-08 DIAGNOSIS — I71.40 ABDOMINAL AORTIC ANEURYSM (AAA) 3.0 CM TO 5.5 CM IN DIAMETER IN MALE: ICD-10-CM

## 2025-04-08 DIAGNOSIS — E78.2 MIXED HYPERLIPIDEMIA: Primary | ICD-10-CM

## 2025-04-08 PROCEDURE — 3078F DIAST BP <80 MM HG: CPT | Performed by: NURSE PRACTITIONER

## 2025-04-08 PROCEDURE — 99213 OFFICE O/P EST LOW 20 MIN: CPT | Performed by: NURSE PRACTITIONER

## 2025-04-08 PROCEDURE — 3074F SYST BP LT 130 MM HG: CPT | Performed by: NURSE PRACTITIONER

## 2025-04-08 PROCEDURE — 1126F AMNT PAIN NOTED NONE PRSNT: CPT | Performed by: NURSE PRACTITIONER

## 2025-04-08 PROCEDURE — 76775 US EXAM ABDO BACK WALL LIM: CPT | Performed by: RADIOLOGY

## 2025-04-08 ASSESSMENT — PAIN SCALES - GENERAL: PAINLEVEL_OUTOF10: NO PAIN (0)

## 2025-04-08 NOTE — PROGRESS NOTES
VASCULAR SURGERY PROGRESS NOTE    LOCATION: [facility]     Tiago Torres  Medical Record #:  [id number]  YOB: 1955  Age:  69 year old     Date of Service: 4/8/2025    PRIMARY CARE PROVIDER: Jean Pierre Barlow    Reason for visit: 6 months follow-up, infrarenal AAA    Impression/Shared Medical Decision Making:     Hyperlipidemia  Infrarenal AAA  BPH with nocturia  Prediabetes, hemoglobin A1c 5.7%    Recommendations/Plan:   Tiago Torres is a pleasant 69-year-old gentleman who presents to vascular clinic for 6 months follow-up and surveillance of the infrarenal AAA. Repeat aortic duplex demonstrates distal infrarenal AAA at 3 cm, previously at 3.6 cm.    Considering these findings, follow-up with vascular on as-needed basis. Recommend rechecking infrarenal AAA every 2 to 3 years to ensure stability. We will check a lipid panel as patient is moving to [facility] shortly and it is unclear whether his statin dose is therapeutic, last lipid panel was completed prior to his initiation of simvastatin.     Information/Education: patient able to teach back, engaged in self-care. Patient agreeable to plan of care: yes.   All questions were answered and support provided. He has our contact information and knows to reach out with any additional questions or concerns.     It was a pleasure seeing Mr. Torres in clinic today.    Tayler Piedra [facility]  Vascular Surgery  Pager: [phone]  [email or web address]  Send message or 10 digit call back number Securely via [employer] with the [employer] Web Console (learn more here)      17 minutes spent on the date of the encounter doing chart review, review of outside records, review of test results, interpretation of tests, patient visit, documentation and discussion with other provider(s).    HPI:  Tiago Torres is a 69 year old male with past medical history significant for known infrarenal AAA, last measured at 3.6. Asymptomatic. Repeat abdominal ultrasound  demonstrates 3.0 distal infrarenal AAA. Patient is moving to [place] in the next few days thus recommend establishing vascular follow-up in the next few years and intermittent surveillance of his infrarenal AAA.    REVIEW OF SYSTEMS:    A 12 point ROS was reviewed and is negative except for what is listed above in HPI.    PHH:    Past Medical History:   Diagnosis Date    Pure hypercholesterolemia 11/05/2019          Past Surgical History:   Procedure Laterality Date    ZZC APPENDECTOMY      Description: Appendectomy;  Recorded: 12/11/2008;       ALLERGIES:  Patient has no known allergies.    MEDS:    Current Outpatient Medications:     aspirin 81 MG EC tablet, Take 1 tablet (81 mg) by mouth daily., Disp: 90 tablet, Rfl: 3    rosuvastatin (CRESTOR) 20 MG tablet, Take 1 tablet (20 mg) by mouth daily., Disp: 90 tablet, Rfl: 3    tamsulosin (FLOMAX) 0.4 MG capsule, Take 1 capsule (0.4 mg) by mouth daily, Disp: 90 capsule, Rfl: 3    SOCIAL HABITS:    History   Smoking Status    Former    Types: Cigarettes   Smokeless Tobacco    Never     Social History    Substance and Sexual Activity      Alcohol use: Not Currently      History   Drug Use Unknown       FAMILY HISTORY:    Family History   Problem Relation Age of Onset    Asthma Mother     Liver Cancer Father        PE:  /68 (BP Location: Left arm, Patient Position: Sitting, Cuff Size: Adult Regular)   Pulse 103   Wt 166 lb 3.2 oz   SpO2 97%   BMI 23.68 kg/m    Wt Readings from Last 1 Encounters:   04/08/25 166 lb 3.2 oz     Body mass index is 23.68 kg/m .    EXAM:  General: No apparent distress. Answers questions appropriately.   Neurologic: Focally intact, Alert and oriented x 3.   Eyes: Grossly normal.   Cardiac:  RRR  Pulmonary: Non labored breathing with normal respiratory effort  Abdominal: Soft, non distended, non tender to palpation. No pulsatile mass.   Musculoskeletal/Extremity: 5/5 gross bilateral upper and lower extremity strength. no edema. No open  wounds or abrasions.       DIAGNOSTIC STUDIES:     Images:  US Abdominal Aorta Imaging    Result Date: 4/8/2025  ULTRASOUND ABDOMINAL AORTA 4/8/2025 1:43 PM CLINICAL HISTORY: AAA at 3.6cm increased from 2023 which was 3.1cm; Abdominal aortic aneurysm (AAA) without rupture, unspecified part; Abdominal aortic aneurysm (AAA) 3.0 cm to 5.5 cm in diameter in male. Screening abdominal aortic ultrasound requested. COMPARISONS: Ultrasound abdominal aorta 8/19/2024. ORDERING PROVIDER: JOSÉ MIGUEL LABOY TECHNIQUE: Grayscale images were obtained through the aorta and common iliac arteries. Diameters were measured in the longitudinal and transverse planes. Infrarenal aorta evaluated with color Doppler and Doppler waveform ultrasound. FINDINGS: Artery diameters (longitudinal / transverse): Aorta:      Supraceliac: 2.1 cm /  2.1 x 2.0 cm      Infrarenal, proximal: 1.7 cm /  1.7 x 1.7 cm      Infrarenal, mid: 2.1 cm /  2.1 x 2.4 cm      Infrarenal, distal: 2.9 cm x 5.7 cm craniocaudal length /  3.0 x 2.9 cm, 54/14 cm/s, multiphasic Right common iliac artery: 1.0 cm /  1.0 x 1.1 cm Left common iliac artery: 1.2 cm /  1.2 x 1.2 cm     IMPRESSION: 1. Distal aorta measures up to 3.0 cm in diameter, previously 3.6 cm. Aorta diameter measurement classification: < 2.5 cm: Normal 2.5 - 2.9 cm: Ectatic > 3 cm: Aneurysmal Iliac artery: Males: > or = 1.7 cm: Ectatic Females: > or = 1.5 cm: Ectatic > 2.5 cm: Aneurysmal Costa F, Georges G, Autumn M et-al. Managing incidental findings on abdominal and pelvic CT and MRI, Part 2: white paper of the ACR Incidental Findings Committee II on vascular findings. J Am Adeel Radiol. 2013;10 (10): 789-94. doi:10.1016/[email or web address] LEIGH FELDER MD   SYSTEM ID:  [id number]    LABS:      Sodium   Date Value Ref Range Status   08/13/2024 141 135 - 145 mmol/L Final   08/07/2023 141 136 - 145 mmol/L Final   10/06/2022 140 133 - 144 mmol/L Final     Urea Nitrogen   Date Value Ref Range Status   08/13/2024 19.4 8.0 -  23.0 mg/dL Final   08/07/2023 14.9 8.0 - 23.0 mg/dL Final   10/06/2022 23 7 - 30 mg/dL Final     Hemoglobin   Date Value Ref Range Status   08/07/2023 15.2 13.3 - 17.7 g/dL Final   10/06/2022 14.9 13.3 - 17.7 g/dL Final     Platelet Count   Date Value Ref Range Status   08/07/2023 195 150 - 450 10e3/uL Final   10/06/2022 190 150 - 450 10e3/uL Final

## 2025-04-08 NOTE — PATIENT INSTRUCTIONS
Thank you so much for choosing us for your care. It was a pleasure to see you at the vascular clinic today.     Follow-up recommendations: As needed, your infrarenal AAA is now kathleen to 3cm     Additional testing/imaging ordered today: Lipid profile within 1-2 days        Our scheduling team will get in touch with you to set up any follow-up testing/imaging and/or appointments. Please be aware that any testing/imaging recommended today will need to completed prior to your next visit with the provider. If testing/imaging is not completed prior to your next visit, your visit may be rescheduled.        If you have any questions, please contact our clinic directly at [phone] and ask for the nurse. We also encourage the use of [employer] to communicate with your HealthCare Provider.        If you have an urgent need after business hours (8:00 am to 4:30 pm) please call [phone], option 4, and ask for the vascular attending on call. For non-urgent after hours needs, please call the vascular nurse at [phone] and leave a detailed voicemail. For scheduling needs, please call our clinic directly at [phone].

## 2025-04-08 NOTE — NURSING NOTE
Chief Complaint   Patient presents with    Follow Up     4/8/2025 visit with Tayler Piedra APRN CNP for RETURN VASCULAR PATIENT - 6 Month follow up Abdominal aortic aneurysm (AAA) without rupture       Vitals were taken and medications reconciled.    Shlomo Lima, Visit Facilitator  2:24 PM

## 2025-04-08 NOTE — LETTER
4/8/2025       RE: Tiago Torres  [address]  [facility] [address]     Dear Colleague,    Thank you for referring your patient, Tiago Torres, to the [facility] [place] at [facility]. Please see a copy of my visit note below.        VASCULAR SURGERY PROGRESS NOTE    LOCATION: [facility] (formerly [facility])[3]     Tiago Torres  Medical Record #:  [id number]  YOB: 1955  Age:  69 year old     Date of Service: 4/8/2025    PRIMARY CARE PROVIDER: Jean Pierre Barlow    Reason for visit: 6 months follow-up, infrarenal AAA    Impression/Shared Medical Decision Making:     Hyperlipidemia  Infrarenal AAA  BPH with nocturia  Prediabetes, hemoglobin A1c was 6.5 0.7%    Recommendations/Plan:   Tiago Torres is a pleasant 69-year-old gentleman who presents to vascular clinic for 6 months follow-up and surveillance of the infrarenal AAA. Repeat aortic duplex demonstrates distal infrarenal AAA at 3 cm, previously at 3.6 cm.    Considering these findings, follow-up with vascular on as-needed basis. Recommend rechecking infrarenal AAA every 2 to 3 years to ensure stability. We will check a lipid panel as patient is moving to [facility] shortly and it is unclear whether his statin dose is therapeutic, last lipid panel was completed prior to his initiation of simvastatin.     Information/Education: patient able to teach back, engaged in self-care. Patient agreeable to plan of care: yes.   All questions were answered and support provided. He has our contact information and knows to reach out with any additional questions or concerns.     It was a pleasure seeing Mr. Torres in clinic today.    Tayler Piedra, CNP  Vascular Surgery  Pager: [phone]  [email or web address]  Send message or 10 digit call back number Securely via [employer] with the [employer] Web Console (learn more here)      17 minutes spent on the date of the encounter doing chart review,  review of outside records, review of test results, interpretation of tests, patient visit, documentation and discussion with other provider(s).    HPI:  Tiago Torres is a 69 year old male with past medical history significant for known infrarenal AAA, last measured at 3.6. Asymptomatic. Repeat abdominal ultrasound demonstrates 3.0 distal infrarenal AAA. Patient is moving to [facility] in the next few days thus recommend establishing vascular follow-up in the next few years and intermittent surveillance of his infrarenal AAA.    REVIEW OF SYSTEMS:    A 12 point ROS was reviewed and is negative except for what is listed above in HPI.    PHH:    Past Medical History:   Diagnosis Date     Pure hypercholesterolemia 11/05/2019          Past Surgical History:   Procedure Laterality Date     ZZC APPENDECTOMY      Description: Appendectomy;  Recorded: 12/11/2008;       ALLERGIES:  Patient has no known allergies.    MEDS:    Current Outpatient Medications:      aspirin 81 MG EC tablet, Take 1 tablet (81 mg) by mouth daily., Disp: 90 tablet, Rfl: 3     rosuvastatin (CRESTOR) 20 MG tablet, Take 1 tablet (20 mg) by mouth daily., Disp: 90 tablet, Rfl: 3     tamsulosin (FLOMAX) 0.4 MG capsule, Take 1 capsule (0.4 mg) by mouth daily, Disp: 90 capsule, Rfl: 3    SOCIAL HABITS:    History   Smoking Status     Former     Types: Cigarettes   Smokeless Tobacco     Never     Social History    Substance and Sexual Activity      Alcohol use: Not Currently      History   Drug Use Unknown       FAMILY HISTORY:    Family History   Problem Relation Age of Onset     Asthma Mother      Liver Cancer Father        PE:  /68 (BP Location: Left arm, Patient Position: Sitting, Cuff Size: Adult Regular)   Pulse 103   Wt 166 lb 3.2 oz   SpO2 97%   BMI 23.68 kg/m    Wt Readings from Last 1 Encounters:   04/08/25 166 lb 3.2 oz     Body mass index is 23.68 kg/m .    EXAM:  General: No apparent distress. Answers questions appropriately.   Neurologic:  Focally intact, Alert and oriented x 3.   Eyes: Grossly normal.   Cardiac:  RRR  Pulmonary: Non labored breathing with normal respiratory effort  Abdominal: Soft, non distended, non tender to palpation. No pulsatile mass.   Musculoskeletal/Extremity: 5/5 gross bilateral upper and lower extremity strength. no edema. No open wounds or abrasions.       DIAGNOSTIC STUDIES:     Images:  US Abdominal Aorta Imaging    Result Date: 4/8/2025  ULTRASOUND ABDOMINAL AORTA 4/8/2025 1:43 PM CLINICAL HISTORY: AAA at 3.6cm increased from 2023 which was 3.1cm; Abdominal aortic aneurysm (AAA) without rupture, unspecified part; Abdominal aortic aneurysm (AAA) 3.0 cm to 5.5 cm in diameter in male. Screening abdominal aortic ultrasound requested. COMPARISONS: Ultrasound abdominal aorta 8/19/2024. ORDERING PROVIDER: JOSÉ MIGUEL LABOY TECHNIQUE: Grayscale images were obtained through the aorta and common iliac arteries. Diameters were measured in the longitudinal and transverse planes. Infrarenal aorta evaluated with color Doppler and Doppler waveform ultrasound. FINDINGS: Artery diameters (longitudinal / transverse): Aorta:      Supraceliac: 2.1 cm /  2.1 x 2.0 cm      Infrarenal, proximal: 1.7 cm /  1.7 x 1.7 cm      Infrarenal, mid: 2.1 cm /  2.1 x 2.4 cm      Infrarenal, distal: 2.9 cm x 5.7 cm craniocaudal length /  3.0 x 2.9 cm, 54/14 cm/s, multiphasic Right common iliac artery: 1.0 cm /  1.0 x 1.1 cm Left common iliac artery: 1.2 cm /  1.2 x 1.2 cm     IMPRESSION: 1. Distal aorta measures up to 3.0 cm in diameter, previously 3.6 cm. Aorta diameter measurement classification: < 2.5 cm: Normal 2.5 - 2.9 cm: Ectatic > 3 cm: Aneurysmal Iliac artery: Males: > or = 1.7 cm: Ectatic Females: > or = 1.5 cm: Ectatic > 2.5 cm: Aneurysmal Costa GARZA, Georges G, Autumn SALDIVAR et-al. Managing incidental findings on abdominal and pelvic CT and MRI, Part 2: white paper of the ACR Incidental Findings Committee II on vascular findings. J Am Adeel Radiol. 2013;10  (88): 633-02. doi:10.1016/[email or web address] LEIGH FELDER MD   SYSTEM ID:  [id number]    LABS:      Sodium   Date Value Ref Range Status   08/13/2024 141 135 - 145 mmol/L Final   08/07/2023 141 136 - 145 mmol/L Final   10/06/2022 140 133 - 144 mmol/L Final     Urea Nitrogen   Date Value Ref Range Status   08/13/2024 19.4 8.0 - 23.0 mg/dL Final   08/07/2023 14.9 8.0 - 23.0 mg/dL Final   10/06/2022 23 7 - 30 mg/dL Final     Hemoglobin   Date Value Ref Range Status   08/07/2023 15.2 13.3 - 17.7 g/dL Final   10/06/2022 14.9 13.3 - 17.7 g/dL Final     Platelet Count   Date Value Ref Range Status   08/07/2023 195 150 - 450 10e3/uL Final   10/06/2022 190 150 - 450 10e3/uL Final            Again, thank you for allowing me to participate in the care of your patient.      Sincerely,    MERCY Bundy CNP

## 2025-04-26 ENCOUNTER — MYC REFILL (OUTPATIENT)
Dept: FAMILY MEDICINE | Facility: CLINIC | Age: 70
End: 2025-04-26
Payer: COMMERCIAL

## 2025-04-26 DIAGNOSIS — N40.1 BENIGN PROSTATIC HYPERPLASIA WITH NOCTURIA: ICD-10-CM

## 2025-04-26 DIAGNOSIS — E78.2 MIXED HYPERLIPIDEMIA: ICD-10-CM

## 2025-04-26 DIAGNOSIS — R35.1 BENIGN PROSTATIC HYPERPLASIA WITH NOCTURIA: ICD-10-CM

## 2025-04-28 RX ORDER — ASPIRIN 81 MG/1
81 TABLET ORAL DAILY
Qty: 90 TABLET | Refills: 3 | OUTPATIENT
Start: 2025-04-28

## 2025-04-28 RX ORDER — ROSUVASTATIN CALCIUM 20 MG/1
20 TABLET, COATED ORAL DAILY
Qty: 90 TABLET | Refills: 3 | OUTPATIENT
Start: 2025-04-28

## 2025-04-28 RX ORDER — TAMSULOSIN HYDROCHLORIDE 0.4 MG/1
0.4 CAPSULE ORAL DAILY
Qty: 90 CAPSULE | Refills: 3 | OUTPATIENT
Start: 2025-04-28

## 2025-04-28 NOTE — TELEPHONE ENCOUNTER
Pharmacy requested refills that are already active on file. Refused request to pharmacy.  
Maternal Clinical Indication/[place]'s Clinical Indication

## 2025-07-21 ENCOUNTER — PATIENT OUTREACH (OUTPATIENT)
Dept: CARE COORDINATION | Facility: CLINIC | Age: 70
End: 2025-07-21
Payer: COMMERCIAL

## 2025-08-04 ENCOUNTER — PATIENT OUTREACH (OUTPATIENT)
Dept: CARE COORDINATION | Facility: CLINIC | Age: 70
End: 2025-08-04
Payer: COMMERCIAL

## 2025-08-10 DIAGNOSIS — E78.2 MIXED HYPERLIPIDEMIA: ICD-10-CM

## 2025-08-11 RX ORDER — ROSUVASTATIN CALCIUM 20 MG/1
20 TABLET, COATED ORAL DAILY
Qty: 90 TABLET | Refills: 0 | Status: SHIPPED | OUTPATIENT
Start: 2025-08-11

## 2025-08-11 RX ORDER — ASPIRIN 81 MG/1
81 TABLET, COATED ORAL DAILY
Qty: 90 TABLET | Refills: 0 | Status: SHIPPED | OUTPATIENT
Start: 2025-08-11